# Patient Record
Sex: MALE | Race: WHITE | Employment: OTHER | URBAN - METROPOLITAN AREA
[De-identification: names, ages, dates, MRNs, and addresses within clinical notes are randomized per-mention and may not be internally consistent; named-entity substitution may affect disease eponyms.]

---

## 2024-03-22 ENCOUNTER — LAB REQUISITION (OUTPATIENT)
Dept: LAB | Facility: HOSPITAL | Age: 79
End: 2024-03-22
Payer: COMMERCIAL

## 2024-03-22 PROCEDURE — 88342 IMHCHEM/IMCYTCHM 1ST ANTB: CPT

## 2024-03-22 PROCEDURE — 88312 SPECIAL STAINS GROUP 1: CPT

## 2024-03-22 PROCEDURE — 88365 INSITU HYBRIDIZATION (FISH): CPT

## 2024-03-22 PROCEDURE — 88342 IMHCHEM/IMCYTCHM 1ST ANTB: CPT | Performed by: PATHOLOGY

## 2024-03-22 PROCEDURE — 88323 CONSLTJ&REPRT MATRL PREP SLD: CPT | Performed by: PATHOLOGY

## 2024-03-22 PROCEDURE — 88313 SPECIAL STAINS GROUP 2: CPT | Performed by: PATHOLOGY

## 2024-03-22 PROCEDURE — 88312 SPECIAL STAINS GROUP 1: CPT | Performed by: PATHOLOGY

## 2024-03-22 PROCEDURE — 88313 SPECIAL STAINS GROUP 2: CPT

## 2024-03-22 PROCEDURE — 88341 IMHCHEM/IMCYTCHM EA ADD ANTB: CPT | Performed by: PATHOLOGY

## 2024-03-22 PROCEDURE — 88365 INSITU HYBRIDIZATION (FISH): CPT | Performed by: PATHOLOGY

## 2024-03-22 PROCEDURE — 88341 IMHCHEM/IMCYTCHM EA ADD ANTB: CPT

## 2024-03-28 LAB
LAB AP ASR DISCLAIMER: NORMAL
LABORATORY COMMENT REPORT: NORMAL
PATH REPORT.FINAL DX SPEC: NORMAL
PATH REPORT.GROSS SPEC: NORMAL
PATH REPORT.MICROSCOPIC SPEC OTHER STN: NORMAL
PATH REPORT.RELEVANT HX SPEC: NORMAL
PATH REPORT.TOTAL CANCER: NORMAL

## 2024-04-01 ENCOUNTER — LAB (OUTPATIENT)
Dept: LAB | Facility: HOSPITAL | Age: 79
End: 2024-04-01
Payer: COMMERCIAL

## 2024-04-01 ENCOUNTER — OFFICE VISIT (OUTPATIENT)
Dept: HEMATOLOGY/ONCOLOGY | Facility: HOSPITAL | Age: 79
End: 2024-04-01
Payer: COMMERCIAL

## 2024-04-01 VITALS
TEMPERATURE: 94.6 F | WEIGHT: 163.5 LBS | RESPIRATION RATE: 16 BRPM | DIASTOLIC BLOOD PRESSURE: 70 MMHG | HEART RATE: 82 BPM | SYSTOLIC BLOOD PRESSURE: 138 MMHG | OXYGEN SATURATION: 99 %

## 2024-04-01 DIAGNOSIS — D61.01 PURE RED CELL APLASIA (MULTI): ICD-10-CM

## 2024-04-01 DIAGNOSIS — E72.12 METHYLENETETRAHYDROFOLATE REDUCTASE DEFICIENCY (MULTI): ICD-10-CM

## 2024-04-01 DIAGNOSIS — D61.01 PURE RED CELL APLASIA (MULTI): Primary | ICD-10-CM

## 2024-04-01 LAB
BASOPHILS # BLD AUTO: 0.02 X10*3/UL (ref 0–0.1)
BASOPHILS NFR BLD AUTO: 0.4 %
EOSINOPHIL # BLD AUTO: 0.07 X10*3/UL (ref 0–0.4)
EOSINOPHIL NFR BLD AUTO: 1.3 %
ERYTHROCYTE [DISTWIDTH] IN BLOOD BY AUTOMATED COUNT: 14.4 % (ref 11.5–14.5)
HCT VFR BLD AUTO: 27.6 % (ref 41–52)
HGB BLD-MCNC: 9.3 G/DL (ref 13.5–17.5)
HGB RETIC QN: 32 PG (ref 28–38)
HOLD SPECIMEN: NORMAL
IMM GRANULOCYTES # BLD AUTO: 0 X10*3/UL (ref 0–0.5)
IMM GRANULOCYTES NFR BLD AUTO: 0 % (ref 0–0.9)
IMMATURE RETIC FRACTION: 1.4 %
LYMPHOCYTES # BLD AUTO: 1.79 X10*3/UL (ref 0.8–3)
LYMPHOCYTES NFR BLD AUTO: 33.5 %
MCH RBC QN AUTO: 29.2 PG (ref 26–34)
MCHC RBC AUTO-ENTMCNC: 33.7 G/DL (ref 32–36)
MCV RBC AUTO: 87 FL (ref 80–100)
MONOCYTES # BLD AUTO: 0.84 X10*3/UL (ref 0.05–0.8)
MONOCYTES NFR BLD AUTO: 15.7 %
NEUTROPHILS # BLD AUTO: 2.62 X10*3/UL (ref 1.6–5.5)
NEUTROPHILS NFR BLD AUTO: 49.1 %
NRBC BLD-RTO: 0 /100 WBCS (ref 0–0)
PLATELET # BLD AUTO: 159 X10*3/UL (ref 150–450)
RBC # BLD AUTO: 3.18 X10*6/UL (ref 4.5–5.9)
RETICS #: 0.01 X10*6/UL (ref 0.02–0.11)
RETICS/RBC NFR AUTO: 0.2 % (ref 0.5–2)
WBC # BLD AUTO: 5.3 X10*3/UL (ref 4.4–11.3)

## 2024-04-01 PROCEDURE — 88185 FLOWCYTOMETRY/TC ADD-ON: CPT | Mod: TC

## 2024-04-01 PROCEDURE — 85025 COMPLETE CBC W/AUTO DIFF WBC: CPT

## 2024-04-01 PROCEDURE — 88189 FLOWCYTOMETRY/READ 16 & >: CPT | Performed by: INTERNAL MEDICINE

## 2024-04-01 PROCEDURE — 1159F MED LIST DOCD IN RCRD: CPT | Performed by: INTERNAL MEDICINE

## 2024-04-01 PROCEDURE — 82668 ASSAY OF ERYTHROPOIETIN: CPT

## 2024-04-01 PROCEDURE — 99417 PROLNG OP E/M EACH 15 MIN: CPT | Performed by: INTERNAL MEDICINE

## 2024-04-01 PROCEDURE — 99215 OFFICE O/P EST HI 40 MIN: CPT | Performed by: INTERNAL MEDICINE

## 2024-04-01 PROCEDURE — 1126F AMNT PAIN NOTED NONE PRSNT: CPT | Performed by: INTERNAL MEDICINE

## 2024-04-01 PROCEDURE — 85045 AUTOMATED RETICULOCYTE COUNT: CPT

## 2024-04-01 PROCEDURE — 36415 COLL VENOUS BLD VENIPUNCTURE: CPT

## 2024-04-01 PROCEDURE — 99205 OFFICE O/P NEW HI 60 MIN: CPT | Performed by: INTERNAL MEDICINE

## 2024-04-01 RX ORDER — LORATADINE 10 MG/1
10 TABLET ORAL DAILY
COMMUNITY

## 2024-04-01 RX ORDER — LOSARTAN POTASSIUM 50 MG/1
50 TABLET ORAL DAILY
COMMUNITY

## 2024-04-01 RX ORDER — HYDROGEN PEROXIDE 3 %
20 SOLUTION, NON-ORAL MISCELLANEOUS
COMMUNITY

## 2024-04-01 RX ORDER — PREGABALIN 25 MG/1
25 CAPSULE ORAL 2 TIMES DAILY
COMMUNITY

## 2024-04-01 RX ORDER — EVOLOCUMAB 140 MG/ML
140 INJECTION, SOLUTION SUBCUTANEOUS
COMMUNITY

## 2024-04-01 RX ORDER — ASPIRIN 81 MG/1
81 TABLET ORAL DAILY
COMMUNITY

## 2024-04-01 ASSESSMENT — ENCOUNTER SYMPTOMS
LOSS OF SENSATION IN FEET: 0
DEPRESSION: 1
OCCASIONAL FEELINGS OF UNSTEADINESS: 0

## 2024-04-01 ASSESSMENT — PAIN SCALES - GENERAL: PAINLEVEL_OUTOF10: 0-NO PAIN

## 2024-04-01 NOTE — LETTER
April 4, 2024     Armand Oconnell MD  [address] [place] [address] Hematology And Oncology  [place] [id number]    Patient: Leopoldo Angel   YOB: 1945   Date of Visit: 4/1/2024       Dear Dr. Anish MD and Dr. Alcaraz:     Thank you for referring Leopoldo Angel to me for evaluation. Below are my notes for this consultation.  I agree with the suspected diagnosis of red cell aplasia and I think the differential diagnosis favors a virally mediated process, but I am obtaining peripheral blood flow cytometry and will review his case at tumor board for other potential causes.  This should occur on Thursday 4/4.     If you have questions, please do not hesitate to call me. I look forward to following your patient along with you.       Sincerely,     Philip Tovar MD      CC: Elias Phipps MD  ______________________________________________________________________________________    Patient ID: Leopoldo Angel is a 79 y.o. male.  Referring Physician: Armand Oconnell MD  [address] [place] [address] [place] [address]  Primary Care Provider: No primary care provider on file.    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  Retic count was profoundly low. Initial evaluation with normal folate, normal to high B12, high ferritin, high to normal iron,  parvovirus B19 serology positive for IgG but not IgM    Notably, haptoglobin low at < 10, LDH slightly high, but negative DAPHNE and bilirubin normal.     Had a bone marrow biopsy completed 3/18 demonstrating:   BONE MARROW CLOT WITH ASPIRATE AND CORE WITH TOUCH PREP, SITE UNSPECIFIED ([facility], [id number], 3/18/2024):     -- HYPERCELLULAR BONE MARROW (50-60%) WITH MARKED  ERYTHROID HYPOPLASIA AND SCATTERED INTERSTITIAL LYMPHOID AGGREGATES, SOME WITH MINUTE GRANULOMAS, SEE NOTE.   NOTE: Per report, flow cytometry detected a small monoclonal lambda-restricted, CD5-negative, [id number] B-cell population (6% of total events. The aggregates seen are predominantly composed of small T lymphocytes. 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 The marked erythroid hypoplasia with left shifted erythropoiesis are suggestive of red cell aplasia. Clinical and radiological correlation is recommended.                 Current Assessment & Plan     4/1/2024: long discussoin with patient and spouse today.  His medications are.  Despite the low haptoglobin, the length of time that red cell transfusion are lasting make hemolysis seem unlikely, and a red cell aplasia is favored.  The presence of a B-cell clonal population suggests this may be related - I recommended we complete peripheral flow to define this and see if it could be CLL related.  However, imaging and exam are not suggestive of lymphadenopathy, and patient reports this has been present since the 2010s with no previous sequelae.  Reportedly, parvirus is pending, and I will try to obtain additional results from [facility]. Finally, I recommended we review his pathology at tumor board.  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  Suspected to be secondary to this condition                      Oncology History    No history exists.            Subjective      History of Present Illness:  First hematologist.     Mr. Angel recounted most of his history.  Reported that in April 2021, he had an unprovoked DVT with massive PE, included with right heart strain.  Because he was short of breat at the ED.  When they concepcion his blood.  D-dimer was 1500.  Recalled a CT/PE study and could see see his own emboli from the control panel.  Now on lifelong apixiban.     In Oct in 2023, he had abdl pain, and then a CT scan there was consuelo with normal labs at the time.  Had some pain.  After returning radha, four days later, he developed diffuse limb shaking - rigor every where.  They called 911 - a repeat CT scan shows a perforated gall bladder.  He had an operation in the hospital, and he had 3 gram negative organisms.  He remembers vividly having some logical parts intact and then having \"another part\" being loopy.  He didn't remember what he was saying - apparently attempted a lap-choley, but had to convert to open, and had to have a drain in for a little while.  The drain worked well - had a midline going home with piptazo for 1.5 weeks of IV abx with pip-tazo.  Never had a repeat blood cultures.    Then, after getting over that, on 12/19/2024, he came down with an upper respiratory infections - was very wiped out overall. Called his doctor, OK, getting lab and that day and was at dinner, and wasn't feeling, and was again getting a little delirous again, and had another 911 call.  Then he was looking at his results, and found he had Covid.  Wasn't admitted, and responded well to molnuparavir.    Then again started getting fatigue, weakness, and didn't lose his sense of smell " or taste and was just really wiped out at the time and was worried about long covid - and that's when a CBC showed his hematocrit had dropped.  His CRP was very elevated at the time.  Continued to get wiped out over and over, and was referred back to hematoilogy (who was travelling) and ended up with Dr. Miranda.  The hematocrit has been dropping since that time, and getting a unit of blood a week.  This most recent, they wondered about getiting two units - seems like he's still going since a transfusion on Friday - without any other symptoms.  No leg     5-6 years ago, he had a viral infection and had high LFTS, and that's when he discovered he had gall stones (now normal).  He was worried about disease, and had a CT scan, on a caudal scan with a pubic symphisys - 3cm, lytic tumor.  They had a biopsy back then (CT guided) and then an orthopedic surgeon - and at that the time, they didn't know what it was, and it was a grand rounds presentations. PET/CT s have been tracking it, and it's remained mildly active, and has just been sitting there.         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       Objective     VS:  /70 (BP Location: Left arm, Patient Position: Sitting, BP Cuff Size: Adult)   Pulse 82   Temp 34.8 °C (94.6 °F) (Skin)   Resp 16   Wt 74.2 kg (163 lb 8 oz)   SpO2 99%   BSA: There is no height or weight on file to  calculate BSA.    Physical Exam    Laboratory:  Lab on 04/01/2024   Component Date Value Ref Range Status   • WBC 04/01/2024 5.3  4.4 - 11.3 x10*3/uL Final   • nRBC 04/01/2024 0.0  0.0 - 0.0 /100 WBCs Final   • RBC 04/01/2024 3.18 (L)  4.50 - 5.90 x10*6/uL Final   • Hemoglobin 04/01/2024 9.3 (L)  13.5 - 17.5 g/dL Final   • Hematocrit 04/01/2024 27.6 (L)  41.0 - 52.0 % Final   • MCV 04/01/2024 87  80 - 100 fL Final   • MCH 04/01/2024 29.2  26.0 - 34.0 pg Final   • MCHC 04/01/2024 33.7  32.0 - 36.0 g/dL Final   • RDW 04/01/2024 14.4  11.5 - 14.5 % Final   • Platelets 04/01/2024 159  150 - 450 x10*3/uL Final   • Neutrophils % 04/01/2024 49.1  40.0 - 80.0 % Final   • Immature Granulocytes %, Automated 04/01/2024 0.0  0.0 - 0.9 % Final   • Lymphocytes % 04/01/2024 33.5  13.0 - 44.0 % Final   • Monocytes % 04/01/2024 15.7  2.0 - 10.0 % Final   • Eosinophils % 04/01/2024 1.3  0.0 - 6.0 % Final   • Basophils % 04/01/2024 0.4  0.0 - 2.0 % Final   • Neutrophils Absolute 04/01/2024 2.62  1.60 - 5.50 x10*3/uL Final   • Immature Granulocytes Absolute, Au* 04/01/2024 0.00  0.00 - 0.50 x10*3/uL Final   • Lymphocytes Absolute 04/01/2024 1.79  0.80 - 3.00 x10*3/uL Final   • Monocytes Absolute 04/01/2024 0.84 (H)  0.05 - 0.80 x10*3/uL Final   • Eosinophils Absolute 04/01/2024 0.07  0.00 - 0.40 x10*3/uL Final   • Basophils Absolute 04/01/2024 0.02  0.00 - 0.10 x10*3/uL Final   • Retic % 04/01/2024 0.2 (L)  0.5 - 2.0 % Final   • Retic Absolute 04/01/2024 0.006 (L)  0.017 - 0.110 x10*6/uL Final   • Reticulocyte Hemoglobin 04/01/2024 32  28 - 38 pg Final   • Immature Retic fraction 04/01/2024 1.4  <=16.0 % Final   • Extra Tube 04/01/2024 Hold for add-ons.   Final         Pathology:  Bone Marrow Biopsy:   FINAL DIAGNOSIS   Date Value Ref Range Status   03/22/2024   Final    A-B. BONE MARROW CLOT WITH ASPIRATE AND CORE WITH TOUCH PREP, SITE UNSPECIFIED ([facility], [id number], 3/18/2024):     -- HYPERCELLULAR BONE MARROW (50-60%)  WITH MARKED ERYTHROID HYPOPLASIA AND SCATTERED INTERSTITIAL LYMPHOID AGGREGATES, SOME WITH MINUTE GRANULOMAS, SEE NOTE.    NOTE: Per report, flow cytometry detected a small monoclonal lambda-restricted, CD5-negative, [id number] B-cell population (6% of total events. The aggregates seen are predominantly composed of small T lymphocytes. 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 The marked erythroid hypoplasia with left shifted erythropoiesis are suggestive of red cell aplasia. Clinical and radiological correlation is recommended.       Microscopic Description   Date Value Ref Range Status   03/22/2024   Final    Cell Type Value Reference Range   Promyelocytes 4 1-5 %   Myelocytes 10 5-10 %   Metamyelocytes 27 10-25 %   Bands 5.5 10-20 %   Segmented Neutrophils 22 5-30 %   Eosinophils 2.5 2-4 %   Basophils 0 0-1 %        Lymphocytes 22 5-25 %   Monocytes 2 0-2 %   Plasma Cells 0 0-2 %        Blasts 0 0-1 %        Total Erythroid 5 17-35 %        Total Cells Counted 200    Myeloid/Erythroid Ratio 14.2 1.5-4.1     CBC (Per Report): WBC 5.9, RBC 2.32, Hb 7.1, HCT 20.7, MCV 89.1, RDW 14.3, , Neutrophils 47%, Lymphocytes 31%, Monocytes 19%, Eosinophils 3%,     PERIPHERAL SMEAR: Submitted              Red cells: Normocytic anemia with no specific morphology.       White cells: Normal.        Platelets: Normal morphology, slightly decreased.    ASPIRATE SMEAR: Submitted                      Specimen: Aspicular, hemodiluted.       Erythropoiesis: Markedly decreased and left shifted.       Granulopoiesis: Normal.       Megakaryocytes: Present. Morphology: Normal.    TOUCH PREP: Submitted       Specimen: Paucicellular.       Comments: Similar to aspirate smear.    ASPIRATE CLOT: Submitted                           Specimen: Pacuspicular.       Cellularity: 60%          Estimated M:E ratio: Consistent with aspirate smear.       Megakaryocytes: Adequate. Morphology: Normal.       Comments: Presence of few interstitial lymphoid aggregates, some admixed with minute granulomas.    CORE BIOPSY: Submitted                            Specimen: Limited due to marked hemorrhagic disruption artifact.       Cellularity: 50%       Estimated M:E ratio: Consistent with aspirate smear.       Bony trabeculae: Normal.       Megakaryocytes: Adequate. Morphology: Normal.         Comments: Presence of few interstitial lymphoid aggregates, some admixed with minute granulomas.    SPECIAL STAINS performed at [facility]:       Iron: Storage iron decreased but present; no ring sideroblasts identified.       GMS: Negative.       AFB: Negative.    IMMUNOHISTOCHEMISTRY performed at [facility] on block B1:   MPO: Highlights myeloid cells.    E-cadherin: Highlights scattered early erythroid precursors.   CD3: Highlights scattered and majority of lymphocytes in aggregates.   CD20: Highlights scattered and very few cells in the aggregates.    CD5: Similar to CD3.    CD10: Predominantly negative in lymphocytes.   BCL6: Predominantly negative in lymphocytes.   BCL2: Highlights lymphocytes.   Cyclin D1: Highlights scattered histiocytes.   SOX11: Negative.   : Highlights plasma cells (<1%)   Kappa and lambda (IHC): No light chain restriction (Kappa:Lambda=1:1)   Kappa and lambda (ARACELI): No light chain restriction (Kappa:Lambda=1:1)   CD30: Highlights few scattered immunoblasts.    CD15: Highlights many granulocytes.   EDWIN: Negative.    ALK: Negative.    FLOW CYTOMETRY: Performed at [facility].  Per report, a monoclonal lambda moderate, CD19 positive B-cell population (6% of total) was detected expressing CD11c, CD20, CD44, CD52, and FMC7.  They do not express CD5 or CD10.  There was a mixed population of myeloid cells (72% of total) and T cells account for 22%.  There was no increase in blasts.    CYTOGENETICS/  MOLECULAR: Per note, cytogenetics, FISH for AML, and next generation sequencing testing has been sent and results pending at the time of this review.    Immunostains were performed in addition to flow cytometry to fully characterize the phenotype, architecture, and extent of the marrow population(s).  This was medically necessary for the best possible diagnosis.         Gross Description   Date Value Ref Range Status   03/22/2024   Final    A. OUTSIDE BLOCK(S)/SLIDE(S).  Received from [place] [id number] are Four (4) Microscopic slides labeled [id number] & Block A1  B. OUTSIDE SLIDES , PARTS AFTER A.  Received from [place] [id number] are Eleven (11) Microscopic slides labeled [id number] & Block B1                    Philip Tovar MD

## 2024-04-01 NOTE — PROGRESS NOTES
Patient ID: Leopoldo Angel is a 79 y.o. male.  Referring Physician: Armand Oconnell MD  [address] [place] [address] [place] [address]  Primary Care Provider: No primary care provider on file.    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  Retic count was profoundly low. Initial evaluation with normal folate, normal to high B12, high ferritin, high to normal iron,  parvovirus B19 serology positive for IgG but not IgM    Notably, haptoglobin low at < 10, LDH slightly high, but negative DAPHNE and bilirubin normal.     Had a bone marrow biopsy completed 3/18 demonstrating:   BONE MARROW CLOT WITH ASPIRATE AND CORE WITH TOUCH PREP, SITE UNSPECIFIED ([facility], [id number], 3/18/2024):     -- HYPERCELLULAR BONE MARROW (50-60%) WITH MARKED ERYTHROID HYPOPLASIA AND SCATTERED INTERSTITIAL LYMPHOID AGGREGATES, SOME WITH MINUTE GRANULOMAS, SEE NOTE.   NOTE: Per report, flow cytometry detected a small monoclonal lambda-restricted, CD5-negative, RR75-sxddjyoy B-cell population (6% of total events. The aggregates seen are predominantly composed of small T lymphocytes. 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 The marked erythroid hypoplasia with left shifted erythropoiesis are suggestive of red cell aplasia. Clinical and radiological correlation is recommended.                 Current Assessment & Plan     4/1/2024: long discussoin with patient and spouse today.  His medications are.  Despite the low haptoglobin, the length of time that red cell transfusion are lasting make hemolysis seem unlikely, and  a red cell aplasia is favored.  The presence of a B-cell clonal population suggests this may be related - I recommended we complete peripheral flow to define this and see if it could be CLL related.  However, imaging and exam are not suggestive of lymphadenopathy, and patient reports this has been present since the 2010s with no previous sequelae.  Reportedly, parvirus is pending, and I will try to obtain additional results from [place]. Finally, I recommended we review his pathology at tumor board.  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  Suspected to be secondary to this condition                      Oncology History    No history exists.            Subjective       History of Present Illness:  First hematologist.     Mr. Angel recounted most of his history.  Reported that in April 2021, he had an unprovoked DVT with massive PE, included with right heart strain.  Because he was short of breat at the ED.  When they concepcion his blood.  D-dimer was 1500.  Recalled a CT/PE study and could see see his own emboli from the control panel.  Now on lifelong apixiban.     In Oct in 2023, he had abdl pain, and then a CT scan there was consuelo with normal labs at the time.  Had some pain.  After returning [facility], four days later, he developed diffuse limb shaking - rigor every where.  " They called 911 - a repeat CT scan shows a perforated gall bladder.  He had an operation in the hospital, and he had 3 gram negative organisms.  He remembers vividly having some logical parts intact and then having \"another part\" being loopy.  He didn't remember what he was saying - apparently attempted a lap-choley, but had to convert to open, and had to have a drain in for a little while.  The drain worked well - had a midline going home with piptazo for 1.5 weeks of IV abx with pip-tazo.  Never had a repeat blood cultures.    Then, after getting over that, on 12/19/2024, he came down with an upper respiratory infections - was very wiped out overall. Called his doctor, OK, getting lab and that day and was at dinner, and wasn't feeling, and was again getting a little delirous again, and had another 911 call.  Then he was looking at his results, and found he had Covid.  Wasn't admitted, and responded well to molnuparavir.    Then again started getting fatigue, weakness, and didn't lose his sense of smell or taste and was just really wiped out at the time and was worried about long covid - and that's when a CBC showed his hematocrit had dropped.  His CRP was very elevated at the time.  Continued to get wiped out over and over, and was referred back to hematoilogy (who was travelling) and ended up with Dr. Miranda.  The hematocrit has been dropping since that time, and getting a unit of blood a week.  This most recent, they wondered about getiting two units - seems like he's still going since a transfusion on Friday - without any other symptoms.  No leg     5-6 years ago, he had a viral infection and had high LFTS, and that's when he discovered he had gall stones (now normal).  He was worried about disease, and had a CT scan, on a caudal scan with a pubic symphisys - 3cm, lytic tumor.  They had a biopsy back then (CT guided) and then an orthopedic surgeon - and at that the time, they didn't know what it was, and it was " a grand rounds presentations. PET/CT s have been tracking it, and it's remained mildly active, and has just been sitting there.         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       Objective      VS:  /70 (BP Location: Left arm, Patient Position: Sitting, BP Cuff Size: Adult)   Pulse 82   Temp 34.8 °C (94.6 °F) (Skin)   Resp 16   Wt 74.2 kg (163 lb 8 oz)   SpO2 99%   BSA: There is no height or weight on file to calculate BSA.    Physical Exam    Laboratory:  Lab on 04/01/2024   Component Date Value Ref Range Status    WBC 04/01/2024 5.3  4.4 - 11.3 x10*3/uL Final    nRBC 04/01/2024 0.0  0.0 - 0.0 /100 WBCs Final    RBC 04/01/2024 3.18 (L)  4.50 - 5.90 x10*6/uL Final    Hemoglobin 04/01/2024 9.3 (L)  13.5 - 17.5 g/dL Final    Hematocrit 04/01/2024 27.6 (L)  41.0 - 52.0 % Final    MCV 04/01/2024 87  80 - 100 fL Final    MCH 04/01/2024 29.2  26.0 - 34.0 pg Final    MCHC 04/01/2024 33.7  32.0 - 36.0 g/dL Final    RDW 04/01/2024 14.4  11.5 - 14.5 % Final    Platelets 04/01/2024 159  150 - 450 x10*3/uL Final    Neutrophils % 04/01/2024 49.1  40.0 - 80.0 % Final    Immature Granulocytes %, Automated 04/01/2024 0.0  0.0 - 0.9 % Final    Lymphocytes % 04/01/2024 33.5  13.0 - 44.0 % Final    Monocytes % 04/01/2024 15.7  2.0 - 10.0 % Final    Eosinophils % 04/01/2024 1.3  0.0 - 6.0 % Final    Basophils % 04/01/2024 0.4  0.0 - 2.0 %  Final    Neutrophils Absolute 04/01/2024 2.62  1.60 - 5.50 x10*3/uL Final    Immature Granulocytes Absolute, Au* 04/01/2024 0.00  0.00 - 0.50 x10*3/uL Final    Lymphocytes Absolute 04/01/2024 1.79  0.80 - 3.00 x10*3/uL Final    Monocytes Absolute 04/01/2024 0.84 (H)  0.05 - 0.80 x10*3/uL Final    Eosinophils Absolute 04/01/2024 0.07  0.00 - 0.40 x10*3/uL Final    Basophils Absolute 04/01/2024 0.02  0.00 - 0.10 x10*3/uL Final    Retic % 04/01/2024 0.2 (L)  0.5 - 2.0 % Final    Retic Absolute 04/01/2024 0.006 (L)  0.017 - 0.110 x10*6/uL Final    Reticulocyte Hemoglobin 04/01/2024 32  28 - 38 pg Final    Immature Retic fraction 04/01/2024 1.4  <=16.0 % Final    Extra Tube 04/01/2024 Hold for add-ons.   Final         Pathology:  Bone Marrow Biopsy:   FINAL DIAGNOSIS   Date Value Ref Range Status   03/22/2024   Final    A-B. BONE MARROW CLOT WITH ASPIRATE AND CORE WITH TOUCH PREP, SITE UNSPECIFIED ([facility], [id number], 3/18/2024):     -- HYPERCELLULAR BONE MARROW (50-60%) WITH MARKED ERYTHROID HYPOPLASIA AND SCATTERED INTERSTITIAL LYMPHOID AGGREGATES, SOME WITH MINUTE GRANULOMAS, SEE NOTE.    NOTE: Per report, flow cytometry detected a small monoclonal lambda-restricted, CD5-negative, HM36-getupbar B-cell population (6% of total events. The aggregates seen are predominantly composed of small T lymphocytes. 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 The marked erythroid hypoplasia with left shifted erythropoiesis are suggestive of red cell aplasia. Clinical and radiological correlation is recommended.       Microscopic Description   Date Value Ref Range Status   03/22/2024   Final    Cell Type Value Reference Range   Promyelocytes 4 1-5 %   Myelocytes 10 5-10 %   Metamyelocytes 27 10-25 %   Bands 5.5 10-20 %   Segmented Neutrophils 22 5-30 %   Eosinophils 2.5 2-4 %    Basophils 0 0-1 %        Lymphocytes 22 5-25 %   Monocytes 2 0-2 %   Plasma Cells 0 0-2 %        Blasts 0 0-1 %        Total Erythroid 5 17-35 %        Total Cells Counted 200    Myeloid/Erythroid Ratio 14.2 1.5-4.1     CBC (Per Report): WBC 5.9, RBC 2.32, Hb 7.1, HCT 20.7, MCV 89.1, RDW 14.3, , Neutrophils 47%, Lymphocytes 31%, Monocytes 19%, Eosinophils 3%,     PERIPHERAL SMEAR: Submitted              Red cells: Normocytic anemia with no specific morphology.       White cells: Normal.        Platelets: Normal morphology, slightly decreased.    ASPIRATE SMEAR: Submitted                      Specimen: Aspicular, hemodiluted.       Erythropoiesis: Markedly decreased and left shifted.       Granulopoiesis: Normal.       Megakaryocytes: Present. Morphology: Normal.    TOUCH PREP: Submitted       Specimen: Paucicellular.       Comments: Similar to aspirate smear.    ASPIRATE CLOT: Submitted                           Specimen: Pacuspicular.       Cellularity: 60%         Estimated M:E ratio: Consistent with aspirate smear.       Megakaryocytes: Adequate. Morphology: Normal.       Comments: Presence of few interstitial lymphoid aggregates, some admixed with minute granulomas.    CORE BIOPSY: Submitted                            Specimen: Limited due to marked hemorrhagic disruption artifact.       Cellularity: 50%       Estimated M:E ratio: Consistent with aspirate smear.       Bony trabeculae: Normal.       Megakaryocytes: Adequate. Morphology: Normal.         Comments: Presence of few interstitial lymphoid aggregates, some admixed with minute granulomas.    SPECIAL STAINS performed at [facility]:       Iron: Storage iron decreased but present; no ring sideroblasts identified.       GMS: Negative.       AFB: Negative.    IMMUNOHISTOCHEMISTRY performed at [facility] on block B1:   MPO: Highlights myeloid cells.    E-cadherin: Highlights scattered early erythroid precursors.   CD3: Highlights scattered and majority of  lymphocytes in aggregates.   CD20: Highlights scattered and very few cells in the aggregates.    CD5: Similar to CD3.    CD10: Predominantly negative in lymphocytes.   BCL6: Predominantly negative in lymphocytes.   BCL2: Highlights lymphocytes.   Cyclin D1: Highlights scattered histiocytes.   SOX11: Negative.   : Highlights plasma cells (<1%)   Kappa and lambda (IHC): No light chain restriction (Kappa:Lambda=1:1)   Kappa and lambda (ARACELI): No light chain restriction (Kappa:Lambda=1:1)   CD30: Highlights few scattered immunoblasts.    CD15: Highlights many granulocytes.   EDWIN: Negative.    ALK: Negative.    FLOW CYTOMETRY: Performed at [facility].  Per report, a monoclonal lambda moderate, CD19 positive B-cell population (6% of total) was detected expressing CD11c, CD20, CD44, CD52, and FMC7.  They do not express CD5 or CD10.  There was a mixed population of myeloid cells (72% of total) and T cells account for 22%.  There was no increase in blasts.    CYTOGENETICS/ MOLECULAR: Per note, cytogenetics, FISH for AML, and next generation sequencing testing has been sent and results pending at the time of this review.    Immunostains were performed in addition to flow cytometry to fully characterize the phenotype, architecture, and extent of the marrow population(s).  This was medically necessary for the best possible diagnosis.         Gross Description   Date Value Ref Range Status   03/22/2024   Final    A. OUTSIDE BLOCK(S)/SLIDE(S).  Received from [place] [id number] are Four (4) Microscopic slides labeled [id number] & Block A1  B. OUTSIDE SLIDES , PARTS AFTER A.  Received from [place] [id number] are Eleven (11) Microscopic slides labeled [id number] & Block B1                    Philip Tovar MD

## 2024-04-02 PROBLEM — E72.12 METHYLENETETRAHYDROFOLATE REDUCTASE DEFICIENCY (MULTI): Status: ACTIVE | Noted: 2024-04-02

## 2024-04-02 PROBLEM — D61.01: Status: ACTIVE | Noted: 2024-04-02

## 2024-04-02 NOTE — ASSESSMENT & PLAN NOTE
4/1/2024: long discussoin with patient and spouse today.  His medications are.  Despite the low haptoglobin, the length of time that red cell transfusion are lasting make hemolysis seem unlikely, and a red cell aplasia is favored.  The presence of a B-cell clonal population suggests this may be related - I recommended we complete peripheral flow to define this and see if it could be CLL related.  However, imaging and exam are not suggestive of lymphadenopathy, and patient reports this has been present since the 2010s with no previous sequelae.  Reportedly, parvirus is pending, and I will try to obtain additional results from [place]. Finally, I recommended we review his pathology at tumor board.  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     Diagnostics:  - tumor board  - flow cytometry  - will collect remaining/pending results from [place]  Treatment:  - TBD

## 2024-04-03 LAB
CELL COUNT (BLOOD): 5.3 X10*3/UL
CELL POPULATIONS: NORMAL
DIAGNOSIS: NORMAL
EPO SERPL-ACNC: 835 MU/ML (ref 4–27)
FLOW DIFFERENTIAL: NORMAL
FLOW TEST ORDERED: NORMAL
LAB TEST METHOD: NORMAL
NUMBER OF CELLS COLLECTED: NORMAL PER TUBE
PATH REPORT.TOTAL CANCER: NORMAL
SIGNATURE COMMENT: NORMAL
SPECIMEN VIABILITY: NORMAL

## 2024-04-04 ENCOUNTER — TELEMEDICINE (OUTPATIENT)
Dept: HEMATOLOGY/ONCOLOGY | Facility: CLINIC | Age: 79
End: 2024-04-04
Payer: COMMERCIAL

## 2024-04-04 ENCOUNTER — TUMOR BOARD CONFERENCE (OUTPATIENT)
Dept: HEMATOLOGY/ONCOLOGY | Facility: HOSPITAL | Age: 79
End: 2024-04-04
Payer: COMMERCIAL

## 2024-04-04 DIAGNOSIS — D61.01 PURE RED CELL APLASIA (MULTI): Primary | ICD-10-CM

## 2024-04-04 PROCEDURE — 99213 OFFICE O/P EST LOW 20 MIN: CPT | Performed by: INTERNAL MEDICINE

## 2024-04-04 PROCEDURE — 1159F MED LIST DOCD IN RCRD: CPT | Performed by: INTERNAL MEDICINE

## 2024-04-04 ASSESSMENT — ENCOUNTER SYMPTOMS
SORE THROAT: 0
CHILLS: 0
DIFFICULTY URINATING: 0
LIGHT-HEADEDNESS: 0
HEADACHES: 0
DIARRHEA: 0
ABDOMINAL PAIN: 0
FLANK PAIN: 0
NAUSEA: 0
CONFUSION: 0
DYSPHORIC MOOD: 0
BACK PAIN: 0
SHORTNESS OF BREATH: 0
SINUS PRESSURE: 0
UNEXPECTED WEIGHT CHANGE: 0
RHINORRHEA: 0
FATIGUE: 1
BRUISES/BLEEDS EASILY: 0
APPETITE CHANGE: 0
COUGH: 0
ADENOPATHY: 0
SINUS PAIN: 0
JOINT SWELLING: 0
FEVER: 0
ACTIVITY CHANGE: 0
VOMITING: 0
NUMBNESS: 0
NERVOUS/ANXIOUS: 0
CONSTIPATION: 0
DIAPHORESIS: 0
WEAKNESS: 0

## 2024-04-04 NOTE — LETTER
April 4, 2024     Armand Oconnell MD  [address] [facility]  [facility] [address]    Patient: Leopoldo Angel   YOB: 1945   Date of Visit: 4/4/2024       Dear Dr. Armand Oconnell MD:    Thank you for referring Leopoldo Angel to me for evaluation. Below are my notes for his follow up visit today, along with the summary from our discussion in tumor board.  We recommended checking parvovirus DNA as well as a CT chest to r/o thymoma.  After that, recommended glucocorticord therapy - this is similar to treatment for immune thrombocytopenia, with 1mg/kg for at least two weeks to maximal response and then slow taper over 6-8 weeks.      If you have questions, please do not hesitate to call me. I look forward to following your patient along with you.       Sincerely,     Philip Tovar MD      CC: No Recipients  ______________________________________________________________________________________    Patient ID: Leopoldo Angel is a 79 y.o. male.  Referring Physician: [place]  [address] [place] [address]  Primary Care Provider: No primary care provider on file.    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  Retic count was profoundly low. Initial evaluation with normal folate, normal to high B12, high ferritin, high to normal iron,  parvovirus B19 serology positive for IgG but not IgM    Notably, haptoglobin low at < 10, LDH slightly high, but negative DAPHNE and bilirubin normal.     Had a bone marrow biopsy completed 3/18 demonstrating:   BONE MARROW CLOT WITH ASPIRATE AND CORE WITH TOUCH PREP, SITE UNSPECIFIED ([facility], [id number], 3/18/2024):     -- HYPERCELLULAR BONE MARROW (50-60%) WITH  MARKED ERYTHROID HYPOPLASIA AND SCATTERED INTERSTITIAL LYMPHOID AGGREGATES, SOME WITH MINUTE GRANULOMAS, SEE NOTE.   NOTE: Per report, flow cytometry detected a small monoclonal lambda-restricted, CD5-negative, [id number] B-cell population (6% of total events. The aggregates seen are predominantly composed of small T lymphocytes. 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 The marked erythroid hypoplasia with left shifted erythropoiesis are suggestive of red cell aplasia. Clinical and radiological correlation is recommended.                 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  We did not think T-cell clonality testing was needed, but a CT chest to r/o thymoma (does not appear his current imaging includes the upper thoracic area) was recommended, and we would like to get a parvovirus B19 DNA testing (only serology was pending).  Given his transfusion dependence, we also recommended empiric therapy and felt that a course of prednisone may be easier than cyclosporine, as the latter may have increased risks of toxicity.       Diagnostics:  - follow up karyotype  Treatment:  - TBD                          Philip Tovar MD      Verbal consent was requested and obtained from patient on this date for a telehealth visit.    Oncology History    No history exists.        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    Review of Systems   Constitutional:  Positive for fatigue. Negative for activity change, appetite change, chills, diaphoresis, fever and unexpected weight change.   HENT:  Negative  for congestion, mouth sores, nosebleeds, rhinorrhea, sinus pressure, sinus pain and sore throat.    Eyes:  Negative for visual disturbance.   Respiratory:  Negative for cough and shortness of breath.    Cardiovascular:  Negative for chest pain and leg swelling.   Gastrointestinal:  Negative for abdominal pain, constipation, diarrhea, nausea and vomiting.   Genitourinary:  Negative for difficulty urinating and flank pain.   Musculoskeletal:  Negative for back pain and joint swelling.   Skin:  Negative for rash.   Neurological:  Negative for weakness, light-headedness, numbness and headaches.   Hematological:  Negative for adenopathy. Does not bruise/bleed easily.   Psychiatric/Behavioral:  Negative for confusion and dysphoric mood. The patient is not nervous/anxious.        Home Medication Adherence:     OBJECTIVE:    No VS or physical exam - this is a virtual visit.    Laboratory:  Lab on 04/01/2024   Component Date Value Ref Range Status   • WBC 04/01/2024 5.3  4.4 - 11.3 x10*3/uL Final   • nRBC 04/01/2024 0.0  0.0 - 0.0 /100 WBCs Final   • RBC 04/01/2024 3.18 (L)  4.50 - 5.90 x10*6/uL Final   • Hemoglobin 04/01/2024 9.3 (L)  13.5 - 17.5 g/dL Final   • Hematocrit 04/01/2024 27.6 (L)  41.0 - 52.0 % Final   • MCV 04/01/2024 87  80 - 100 fL Final   • MCH 04/01/2024 29.2  26.0 - 34.0 pg Final   • MCHC 04/01/2024 33.7  32.0 - 36.0 g/dL Final   • RDW 04/01/2024 14.4  11.5 - 14.5 % Final   • Platelets 04/01/2024 159  150 - 450 x10*3/uL Final   • Neutrophils % 04/01/2024 49.1  40.0 - 80.0 % Final   • Immature Granulocytes %, Automated 04/01/2024 0.0  0.0 - 0.9 % Final   • Lymphocytes % 04/01/2024 33.5  13.0 - 44.0 % Final   • Monocytes % 04/01/2024 15.7  2.0 - 10.0 % Final   • Eosinophils % 04/01/2024 1.3  0.0 - 6.0 % Final   • Basophils % 04/01/2024 0.4  0.0 - 2.0 % Final   • Neutrophils Absolute 04/01/2024 2.62  1.60 - 5.50 x10*3/uL Final   • Immature Granulocytes Absolute, Au* 04/01/2024 0.00  0.00 - 0.50 x10*3/uL Final    • Lymphocytes Absolute 04/01/2024 1.79  0.80 - 3.00 x10*3/uL Final   • Monocytes Absolute 04/01/2024 0.84 (H)  0.05 - 0.80 x10*3/uL Final   • Eosinophils Absolute 04/01/2024 0.07  0.00 - 0.40 x10*3/uL Final   • Basophils Absolute 04/01/2024 0.02  0.00 - 0.10 x10*3/uL Final   • Retic % 04/01/2024 0.2 (L)  0.5 - 2.0 % Final   • Retic Absolute 04/01/2024 0.006 (L)  0.017 - 0.110 x10*6/uL Final   • Reticulocyte Hemoglobin 04/01/2024 32  28 - 38 pg Final   • Immature Retic fraction 04/01/2024 1.4  <=16.0 % Final   • Erythropoietin 04/01/2024 835 (H)  4 - 27 mU/mL Final   • Case Report 04/01/2024    Final                    Value:Flow Cytometry                                    Case: [id number]                                   Authorizing Provider:  Philip Tovar MD   Collected:           04/01/2024 1503              Ordering Location:     [facility]   Received:            04/01/2024 1504              Pathologist:           Viraj Goodwin MD                                                         Specimen:    Blood, Venous                                                                             • Diagnosis 04/01/2024    Final                    Value:This result contains rich text formatting which cannot be displayed here.   •   04/01/2024    Final                    Value:This result contains rich text formatting which cannot be displayed here.   • Cell Populations 04/01/2024    Final                    Value:This result contains rich text formatting which cannot be displayed here.   • Flow Differential 04/01/2024    Final                    Value:This result contains rich text formatting which cannot be displayed here.   • Flow Test Ordered 04/01/2024 Low Grade Panel  not established Final   • Specimen Viability 04/01/2024 Acceptable  not established Final   • Cell Count 04/01/2024 5.30  not established x10*3/uL Final   • Number of Cells Collected 04/01/2024 100,000.00  not established per  tube Final   • Methodology 04/01/2024    Final                    Value:This result contains rich text formatting which cannot be displayed here.   • Extra Tube 04/01/2024 Hold for add-ons.   Final         Pathology:  Bone Marrow Biopsy:   FINAL DIAGNOSIS   Date Value Ref Range Status   03/22/2024   Final    A-B. BONE MARROW CLOT WITH ASPIRATE AND CORE WITH TOUCH PREP, SITE UNSPECIFIED ([facility], [id number], 3/18/2024):     -- HYPERCELLULAR BONE MARROW (50-60%) WITH MARKED ERYTHROID HYPOPLASIA AND SCATTERED INTERSTITIAL LYMPHOID AGGREGATES, SOME WITH MINUTE GRANULOMAS, SEE NOTE.    NOTE: Per report, flow cytometry detected a small monoclonal lambda-restricted, CD5-negative, [id number] B-cell population (6% of total events. The aggregates seen are predominantly composed of small T lymphocytes. 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 The marked erythroid hypoplasia with left shifted erythropoiesis are suggestive of red cell aplasia. Clinical and radiological correlation is recommended.       Microscopic Description   Date Value Ref Range Status   03/22/2024   Final    Cell Type Value Reference Range   Promyelocytes 4 1-5 %   Myelocytes 10 5-10 %   Metamyelocytes 27 10-25 %   Bands 5.5 10-20 %   Segmented Neutrophils 22 5-30 %   Eosinophils 2.5 2-4 %   Basophils 0 0-1 %        Lymphocytes 22 5-25 %   Monocytes 2 0-2 %   Plasma Cells 0 0-2 %        Blasts 0 0-1 %        Total Erythroid 5 17-35 %        Total Cells Counted 200    Myeloid/Erythroid Ratio 14.2 1.5-4.1     CBC (Per Report): WBC 5.9, RBC 2.32, Hb 7.1, HCT 20.7, MCV 89.1, RDW 14.3, , Neutrophils 47%, Lymphocytes 31%, Monocytes 19%, Eosinophils 3%,     PERIPHERAL SMEAR: Submitted              Red cells: Normocytic anemia with no specific morphology.       White cells: Normal.        Platelets:  Normal morphology, slightly decreased.    ASPIRATE SMEAR: Submitted                      Specimen: Aspicular, hemodiluted.       Erythropoiesis: Markedly decreased and left shifted.       Granulopoiesis: Normal.       Megakaryocytes: Present. Morphology: Normal.    TOUCH PREP: Submitted       Specimen: Paucicellular.       Comments: Similar to aspirate smear.    ASPIRATE CLOT: Submitted                           Specimen: Pacuspicular.       Cellularity: 60%         Estimated M:E ratio: Consistent with aspirate smear.       Megakaryocytes: Adequate. Morphology: Normal.       Comments: Presence of few interstitial lymphoid aggregates, some admixed with minute granulomas.    CORE BIOPSY: Submitted                            Specimen: Limited due to marked hemorrhagic disruption artifact.       Cellularity: 50%       Estimated M:E ratio: Consistent with aspirate smear.       Bony trabeculae: Normal.       Megakaryocytes: Adequate. Morphology: Normal.         Comments: Presence of few interstitial lymphoid aggregates, some admixed with minute granulomas.    SPECIAL STAINS performed at [facility]:       Iron: Storage iron decreased but present; no ring sideroblasts identified.       GMS: Negative.       AFB: Negative.    IMMUNOHISTOCHEMISTRY performed at [facility] on block B1:   MPO: Highlights myeloid cells.    E-cadherin: Highlights scattered early erythroid precursors.   CD3: Highlights scattered and majority of lymphocytes in aggregates.   CD20: Highlights scattered and very few cells in the aggregates.    CD5: Similar to CD3.    CD10: Predominantly negative in lymphocytes.   BCL6: Predominantly negative in lymphocytes.   BCL2: Highlights lymphocytes.   Cyclin D1: Highlights scattered histiocytes.   SOX11: Negative.   : Highlights plasma cells (<1%)   Kappa and lambda (IHC): No light chain restriction (Kappa:Lambda=1:1)   Kappa and lambda (ARACELI): No light chain restriction (Kappa:Lambda=1:1)   CD30: Highlights few  scattered immunoblasts.    CD15: Highlights many granulocytes.   EDWIN: Negative.    ALK: Negative.    FLOW CYTOMETRY: Performed at [facility].  Per report, a monoclonal lambda moderate, CD19 positive B-cell population (6% of total) was detected expressing CD11c, CD20, CD44, CD52, and FMC7.  They do not express CD5 or CD10.  There was a mixed population of myeloid cells (72% of total) and T cells account for 22%.  There was no increase in blasts.    CYTOGENETICS/ MOLECULAR: Per note, cytogenetics, FISH for AML, and next generation sequencing testing has been sent and results pending at the time of this review.    Immunostains were performed in addition to flow cytometry to fully characterize the phenotype, architecture, and extent of the marrow population(s).  This was medically necessary for the best possible diagnosis.         Gross Description   Date Value Ref Range Status   03/22/2024   Final    A. OUTSIDE BLOCK(S)/SLIDE(S).  Received from [place] [id number] are Four (4) Microscopic slides labeled [id number] & Block A1  B. OUTSIDE SLIDES , PARTS AFTER A.  Received from [place] [id number] are Eleven (11) Microscopic slides labeled [id number] & Block B1

## 2024-04-04 NOTE — TUMOR BOARD NOTE
TUMOR BOARD DISCUSSION SUMMARY    PRESENTER: Dr. Philip Tovar     DIAGNOSIS:  Red Cell Aplasia    SUMMARY/PRESENTATION: Leopoldo Angel is a 79 y.o. male patient who presented about 1-2 months after SARS-Cov-2 infection treated with molnuparavir in 12/2023 with severe, newly transfusion dependent anemia.    History: Chronic lymphocytic leukemia    Previous treatment:     Information reviewed: appropriately elevated erythropoietin level  Pathology: (03/2024) Bone Marrow Biopsy - Hypercellular bone marrow (50-60%) with marked erythroid hypoplasia and scattered interstitial lymphoid aggregates, some with minute granulomas small monoclonal B cell population. Suggested of red cell aplasia.     RECOMMENDATIONS: Complete karyotype, start steroids         Disclaimer     [facility] tumor board recommendations represent the consensus opinion of physicians present at a weekly patient care conference. The treating [facility] physician is not always present, and many of the physicians formulating the recommendation have not personally seen or examined the patient under discussion. It is understood that the treating [facility] physician considers the expertise of the Tumor Board Recommendation in formulating his/her plan for the patient. However, in many situations, based on individualized patient considerations, a different plan is determined by the treating physician to be the optimal medical management.     Scribe Attestation  By signing my name below, Mikayla BOURGEOIS Scribe   attest that this documentation has been prepared under the direction and in the presence of MALIGNANT HEME TUMOR BOARD.

## 2024-04-04 NOTE — PROGRESS NOTES
Patient ID: Leopoldo Angel is a 79 y.o. male.  Referring Physician: Philip Tovar MD  [id number] [id number]  Primary Care Provider: No primary care provider on file.    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  Retic count was profoundly low. Initial evaluation with normal folate, normal to high B12, high ferritin, high to normal iron,  parvovirus B19 serology positive for IgG but not IgM    Notably, haptoglobin low at < 10, LDH slightly high, but negative DAPHNE and bilirubin normal.     Had a bone marrow biopsy completed 3/18 demonstrating:   BONE MARROW CLOT WITH ASPIRATE AND CORE WITH TOUCH PREP, SITE UNSPECIFIED ([facility], [id number], 3/18/2024):     -- HYPERCELLULAR BONE MARROW (50-60%) WITH MARKED ERYTHROID HYPOPLASIA AND SCATTERED INTERSTITIAL LYMPHOID AGGREGATES, SOME WITH MINUTE GRANULOMAS, SEE NOTE.   NOTE: Per report, flow cytometry detected a small monoclonal lambda-restricted, CD5-negative, AP95-[id number] B-cell population (6% of total events. The aggregates seen are predominantly composed of small T lymphocytes. 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 The marked erythroid hypoplasia with left shifted erythropoiesis are suggestive of red cell aplasia. Clinical and radiological correlation is recommended.                 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  We did not think T-cell clonality testing was needed, but a CT chest to r/o thymoma (does not  appear his current imaging includes the upper thoracic area) was recommended, and we would like to get a parvovirus B19 DNA testing (only serology was pending).  Given his transfusion dependence, we also recommended empiric therapy and felt that a course of prednisone may be easier than cyclosporine, as the latter may have increased risks of toxicity.       Diagnostics:  - follow up karyotype  Treatment:  - TBD                          Philip Tovar MD      Verbal consent was requested and obtained from patient on this date for a telehealth visit.    Oncology History    No history exists.        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    Review of Systems   Constitutional:  Positive for fatigue. Negative for activity change, appetite change, chills, diaphoresis, fever and unexpected weight change.   HENT:  Negative for congestion, mouth sores, nosebleeds, rhinorrhea, sinus pressure, sinus pain and sore throat.    Eyes:  Negative for visual disturbance.   Respiratory:  Negative for cough and shortness of breath.    Cardiovascular:  Negative for chest pain and leg swelling.   Gastrointestinal:  Negative for abdominal pain, constipation, diarrhea, nausea and vomiting.   Genitourinary:  Negative for difficulty urinating and flank pain.   Musculoskeletal:  Negative for back pain and joint swelling.   Skin:  Negative for rash.   Neurological:  Negative for weakness, light-headedness, numbness and headaches.   Hematological:  Negative for adenopathy. Does not bruise/bleed easily.   Psychiatric/Behavioral:  Negative for confusion and dysphoric mood. The patient is not nervous/anxious.        Home Medication Adherence:     OBJECTIVE:    No VS or physical exam - this is a virtual visit.    Laboratory:  Lab on 04/01/2024   Component Date Value Ref Range  Status    WBC 04/01/2024 5.3  4.4 - 11.3 x10*3/uL Final    nRBC 04/01/2024 0.0  0.0 - 0.0 /100 WBCs Final    RBC 04/01/2024 3.18 (L)  4.50 - 5.90 x10*6/uL Final    Hemoglobin 04/01/2024 9.3 (L)  13.5 - 17.5 g/dL Final    Hematocrit 04/01/2024 27.6 (L)  41.0 - 52.0 % Final    MCV 04/01/2024 87  80 - 100 fL Final    MCH 04/01/2024 29.2  26.0 - 34.0 pg Final    MCHC 04/01/2024 33.7  32.0 - 36.0 g/dL Final    RDW 04/01/2024 14.4  11.5 - 14.5 % Final    Platelets 04/01/2024 159  150 - 450 x10*3/uL Final    Neutrophils % 04/01/2024 49.1  40.0 - 80.0 % Final    Immature Granulocytes %, Automated 04/01/2024 0.0  0.0 - 0.9 % Final    Lymphocytes % 04/01/2024 33.5  13.0 - 44.0 % Final    Monocytes % 04/01/2024 15.7  2.0 - 10.0 % Final    Eosinophils % 04/01/2024 1.3  0.0 - 6.0 % Final    Basophils % 04/01/2024 0.4  0.0 - 2.0 % Final    Neutrophils Absolute 04/01/2024 2.62  1.60 - 5.50 x10*3/uL Final    Immature Granulocytes Absolute, Au* 04/01/2024 0.00  0.00 - 0.50 x10*3/uL Final    Lymphocytes Absolute 04/01/2024 1.79  0.80 - 3.00 x10*3/uL Final    Monocytes Absolute 04/01/2024 0.84 (H)  0.05 - 0.80 x10*3/uL Final    Eosinophils Absolute 04/01/2024 0.07  0.00 - 0.40 x10*3/uL Final    Basophils Absolute 04/01/2024 0.02  0.00 - 0.10 x10*3/uL Final    Retic % 04/01/2024 0.2 (L)  0.5 - 2.0 % Final    Retic Absolute 04/01/2024 0.006 (L)  0.017 - 0.110 x10*6/uL Final    Reticulocyte Hemoglobin 04/01/2024 32  28 - 38 pg Final    Immature Retic fraction 04/01/2024 1.4  <=16.0 % Final    Erythropoietin 04/01/2024 835 (H)  4 - 27 mU/mL Final    Case Report 04/01/2024    Final                    Value:Flow Cytometry                                    Case: [id number]                                   Authorizing Provider:  Philip Tovar MD   Collected:           04/01/2024 1502              Ordering Location:     [facility]   Received:            04/01/2024 1503              Pathologist:           Viraj HOWARD  MD Christa                                                         Specimen:    Blood, Venous                                                                              Diagnosis 04/01/2024    Final                    Value:This result contains rich text formatting which cannot be displayed here.      04/01/2024    Final                    Value:This result contains rich text formatting which cannot be displayed here.    Cell Populations 04/01/2024    Final                    Value:This result contains rich text formatting which cannot be displayed here.    Flow Differential 04/01/2024    Final                    Value:This result contains rich text formatting which cannot be displayed here.    Flow Test Ordered 04/01/2024 Low Grade Panel  not established Final    Specimen Viability 04/01/2024 Acceptable  not established Final    Cell Count 04/01/2024 5.30  not established x10*3/uL Final    Number of Cells Collected 04/01/2024 100,000.00  not established per tube Final    Methodology 04/01/2024    Final                    Value:This result contains rich text formatting which cannot be displayed here.    Extra Tube 04/01/2024 Hold for add-ons.   Final         Pathology:  Bone Marrow Biopsy:   FINAL DIAGNOSIS   Date Value Ref Range Status   03/22/2024   Final    A-B. BONE MARROW CLOT WITH ASPIRATE AND CORE WITH TOUCH PREP, SITE UNSPECIFIED ([facility], [id number], 3/18/2024):     -- HYPERCELLULAR BONE MARROW (50-60%) WITH MARKED ERYTHROID HYPOPLASIA AND SCATTERED INTERSTITIAL LYMPHOID AGGREGATES, SOME WITH MINUTE GRANULOMAS, SEE NOTE.    NOTE: Per report, flow cytometry detected a small monoclonal lambda-restricted, CD5-negative, QP39-[id number] B-cell population (6% of total events. The aggregates seen are predominantly composed of small T lymphocytes. Given the flow cytometry results, the overall findings are consistent with low-level marrow involvement by a B-cell lymphoproliferative disorder below the  morphological level of detection. The marked erythroid hypoplasia with left shifted erythropoiesis are suggestive of red cell aplasia. Clinical and radiological correlation is recommended.       Microscopic Description   Date Value Ref Range Status   03/22/2024   Final    Cell Type Value Reference Range   Promyelocytes 4 1-5 %   Myelocytes 10 5-10 %   Metamyelocytes 27 10-25 %   Bands 5.5 10-20 %   Segmented Neutrophils 22 5-30 %   Eosinophils 2.5 2-4 %   Basophils 0 0-1 %        Lymphocytes 22 5-25 %   Monocytes 2 0-2 %   Plasma Cells 0 0-2 %        Blasts 0 0-1 %        Total Erythroid 5 17-35 %        Total Cells Counted 200    Myeloid/Erythroid Ratio 14.2 1.5-4.1     CBC (Per Report): WBC 5.9, RBC 2.32, Hb 7.1, HCT 20.7, MCV 89.1, RDW 14.3, , Neutrophils 47%, Lymphocytes 31%, Monocytes 19%, Eosinophils 3%,     PERIPHERAL SMEAR: Submitted              Red cells: Normocytic anemia with no specific morphology.       White cells: Normal.        Platelets: Normal morphology, slightly decreased.    ASPIRATE SMEAR: Submitted                      Specimen: Aspicular, hemodiluted.       Erythropoiesis: Markedly decreased and left shifted.       Granulopoiesis: Normal.       Megakaryocytes: Present. Morphology: Normal.    TOUCH PREP: Submitted       Specimen: Paucicellular.       Comments: Similar to aspirate smear.    ASPIRATE CLOT: Submitted                           Specimen: Pacuspicular.       Cellularity: 60%         Estimated M:E ratio: Consistent with aspirate smear.       Megakaryocytes: Adequate. Morphology: Normal.       Comments: Presence of few interstitial lymphoid aggregates, some admixed with minute granulomas.    CORE BIOPSY: Submitted                            Specimen: Limited due to marked hemorrhagic disruption artifact.       Cellularity: 50%       Estimated M:E ratio: Consistent with aspirate smear.       Bony trabeculae: Normal.       Megakaryocytes: Adequate. Morphology: Normal.          Comments: Presence of few interstitial lymphoid aggregates, some admixed with minute granulomas.    SPECIAL STAINS performed at [facility]:       Iron: Storage iron decreased but present; no ring sideroblasts identified.       GMS: Negative.       AFB: Negative.    IMMUNOHISTOCHEMISTRY performed at [facility] on block B1:   MPO: Highlights myeloid cells.    E-cadherin: Highlights scattered early erythroid precursors.   CD3: Highlights scattered and majority of lymphocytes in aggregates.   CD20: Highlights scattered and very few cells in the aggregates.    CD5: Similar to CD3.    CD10: Predominantly negative in lymphocytes.   BCL6: Predominantly negative in lymphocytes.   BCL2: Highlights lymphocytes.   Cyclin D1: Highlights scattered histiocytes.   SOX11: Negative.   : Highlights plasma cells (<1%)   Kappa and lambda (IHC): No light chain restriction (Kappa:Lambda=1:1)   Kappa and lambda (ARACELI): No light chain restriction (Kappa:Lambda=1:1)   CD30: Highlights few scattered immunoblasts.    CD15: Highlights many granulocytes.   EDWIN: Negative.    ALK: Negative.    FLOW CYTOMETRY: Performed at [facility].  Per report, a monoclonal lambda moderate, CD19 positive B-cell population (6% of total) was detected expressing CD11c, CD20, CD44, CD52, and FMC7.  They do not express CD5 or CD10.  There was a mixed population of myeloid cells (72% of total) and T cells account for 22%.  There was no increase in blasts.    CYTOGENETICS/ MOLECULAR: Per note, cytogenetics, FISH for AML, and next generation sequencing testing has been sent and results pending at the time of this review.    Immunostains were performed in addition to flow cytometry to fully characterize the phenotype, architecture, and extent of the marrow population(s).  This was medically necessary for the best possible diagnosis.         Gross Description   Date Value Ref Range Status   03/22/2024   Final    A. OUTSIDE BLOCK(S)/SLIDE(S).  Received from [address] [address]  [address] [place], [place] [address] are Four (4) Microscopic slides labeled [id number] & Block A1  B. OUTSIDE SLIDES , PARTS AFTER A.  Received from [facility] [address] [place] [address] are Eleven (11) Microscopic slides labeled [id number] & Block B1

## 2024-04-11 ENCOUNTER — APPOINTMENT (OUTPATIENT)
Dept: HEMATOLOGY/ONCOLOGY | Facility: CLINIC | Age: 79
End: 2024-04-11
Payer: COMMERCIAL

## 2024-04-29 ENCOUNTER — TELEPHONE (OUTPATIENT)
Dept: HEMATOLOGY/ONCOLOGY | Facility: CLINIC | Age: 79
End: 2024-04-29

## 2024-05-16 ENCOUNTER — TELEMEDICINE (OUTPATIENT)
Dept: HEMATOLOGY/ONCOLOGY | Facility: CLINIC | Age: 79
End: 2024-05-16
Payer: COMMERCIAL

## 2024-05-16 DIAGNOSIS — D61.01 PURE RED CELL APLASIA (MULTI): Primary | ICD-10-CM

## 2024-05-16 PROCEDURE — 1159F MED LIST DOCD IN RCRD: CPT | Performed by: INTERNAL MEDICINE

## 2024-05-16 PROCEDURE — 99213 OFFICE O/P EST LOW 20 MIN: CPT | Performed by: INTERNAL MEDICINE

## 2024-05-16 NOTE — ASSESSMENT & PLAN NOTE
5/16/2024: Patient has been diligently sending his laboratory studies.  At this point in time his hemoglobin has had a near to complete response and has fully improved.  Primary issues now are actually adverse side effects to steroids, we reviewed his progress for steroid taper and side effects, but also fatigue as he comes onto lower doses. Notably, he reports some early proximal muscle weakness.  We discussed a possible taper plan:  5/18 - start 20mg/day  5/25 - start 15mg/day  6/1 - start 10mg/day  6/8 - start 5mg/day  6/15 - start 5mg every other day   6/22 - STOP    Reported that he had a CT neck that demonstrated no evidence of thymoma    Diagnostics:  - none additoinal   Treatment:  -Continue, complete prednisone taper (Dr. Webb's office has been following - can slow CBCs)  Monitoring:   - can consider slowing CBCs to every 2-4 weeks now, or as indicated  Toxicity management:  - advised on exercise and resistance training    Will have virtual follow up in 4-6 weeks to check on progress

## 2024-05-16 NOTE — PROGRESS NOTES
Patient ID: Leopoldo Angel is a 79 y.o. male.  Referring Physician: No referring provider defined for this encounter.  Primary Care Provider: No primary care provider on file.    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  Retic count was profoundly low. Initial evaluation with normal folate, normal to high B12, high ferritin, high to normal iron,  parvovirus B19 serology positive for IgG but not IgM    Notably, haptoglobin low at < 10, LDH slightly high, but negative DAPHNE and bilirubin normal.     Had a bone marrow biopsy completed 3/18 demonstrating:   BONE MARROW CLOT WITH ASPIRATE AND CORE WITH TOUCH PREP, SITE UNSPECIFIED ([facility], [id number], 3/18/2024):     -- HYPERCELLULAR BONE MARROW (50-60%) WITH MARKED ERYTHROID HYPOPLASIA AND SCATTERED INTERSTITIAL LYMPHOID AGGREGATES, SOME WITH MINUTE GRANULOMAS, SEE NOTE.   NOTE: Per report, flow cytometry detected a small monoclonal lambda-restricted, CD5-negative, [id number]-[id number] B-cell population (6% of total events. The aggregates seen are predominantly composed of small T lymphocytes. 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 The marked erythroid hypoplasia with left shifted erythropoiesis are suggestive of red cell aplasia. Clinical and radiological correlation is recommended.                 Current Assessment & Plan     5/16/2024: Patient has been diligently sending his laboratory studies.  At this point in time his hemoglobin has had a near to complete response and has fully improved.  Primary issues now are actually adverse side effects to steroids, we  "reviewed his progress for steroid taper and side effects    Diagnostics:  - follow up karyotype  - parvovirus DNA based assay  - CT chest - r/o thymoma  Treatment:  -Continue, complete prednisone taper (Dr. Webb's office has been following)  Monitoring:   - per Dr. Webb's office    Will have virtual follow up in 6-8 weeks                          Oncology History    No history exists.            Subjective       History of Present Illness:  Now on the 4th day of the 30mg of prednisone. He was wondering about.  Still feels a little different on the 30mg.  Was very hungry on the high dose of prednisone (would eat \"anything that wouldn't eat me\").  At this lower dose.  Energy level is OK - but he's noting that he's definitely more.     He is still have trouble sleeping, but this was happening throughout the steroids.      He had a few questions: has been 7 months since the covid booster.  Wondering about avoiding vaccines - I recommend waiting until he's been off steroids for several months.      Will stay on an every other week monitoring plan.     We reviewed:   Starting on Saturday, he will take his 1st 20mg dose - will take for one week, then decrease by 5mg every week.           Reviewed and Past Medical History, Past Surgical History, Family History, and Social History:    Review of Systems    Home Medications and Adherence Reviewed with Patient.       Objective      VS:  There were no vitals taken for this visit.  BSA: There is no height or weight on file to calculate BSA.    Physical Exam    Laboratory:  Lab on 04/01/2024   Component Date Value Ref Range Status    WBC 04/01/2024 5.3  4.4 - 11.3 x10*3/uL Final    nRBC 04/01/2024 0.0  0.0 - 0.0 /100 WBCs Final    RBC 04/01/2024 3.18 (L)  4.50 - 5.90 x10*6/uL Final    Hemoglobin 04/01/2024 9.3 (L)  13.5 - 17.5 g/dL Final    Hematocrit 04/01/2024 27.6 (L)  41.0 - 52.0 % Final    MCV 04/01/2024 87  80 - 100 fL Final    MCH 04/01/2024 29.2  26.0 - 34.0 pg Final    " MCHC 04/01/2024 33.7  32.0 - 36.0 g/dL Final    RDW 04/01/2024 14.4  11.5 - 14.5 % Final    Platelets 04/01/2024 159  150 - 450 x10*3/uL Final    Neutrophils % 04/01/2024 49.1  40.0 - 80.0 % Final    Immature Granulocytes %, Automated 04/01/2024 0.0  0.0 - 0.9 % Final    Lymphocytes % 04/01/2024 33.5  13.0 - 44.0 % Final    Monocytes % 04/01/2024 15.7  2.0 - 10.0 % Final    Eosinophils % 04/01/2024 1.3  0.0 - 6.0 % Final    Basophils % 04/01/2024 0.4  0.0 - 2.0 % Final    Neutrophils Absolute 04/01/2024 2.62  1.60 - 5.50 x10*3/uL Final    Immature Granulocytes Absolute, Au* 04/01/2024 0.00  0.00 - 0.50 x10*3/uL Final    Lymphocytes Absolute 04/01/2024 1.79  0.80 - 3.00 x10*3/uL Final    Monocytes Absolute 04/01/2024 0.84 (H)  0.05 - 0.80 x10*3/uL Final    Eosinophils Absolute 04/01/2024 0.07  0.00 - 0.40 x10*3/uL Final    Basophils Absolute 04/01/2024 0.02  0.00 - 0.10 x10*3/uL Final    Retic % 04/01/2024 0.2 (L)  0.5 - 2.0 % Final    Retic Absolute 04/01/2024 0.006 (L)  0.017 - 0.110 x10*6/uL Final    Reticulocyte Hemoglobin 04/01/2024 32  28 - 38 pg Final    Immature Retic fraction 04/01/2024 1.4  <=16.0 % Final    Erythropoietin 04/01/2024 835 (H)  4 - 27 mU/mL Final    Case Report 04/01/2024    Final                    Value:Flow Cytometry                                    Case: [id number]                                   Authorizing Provider:  Philip Tovar MD   Collected:           04/01/2024 0242              Ordering Location:     [facility]   Received:            04/01/2024 3323              Pathologist:           Viraj Goodwin MD                                                         Specimen:    Blood, Venous                                                                              Diagnosis 04/01/2024    Final                    Value:This result contains rich text formatting which cannot be displayed here.      04/01/2024    Final                    Value:This result  contains rich text formatting which cannot be displayed here.    Cell Populations 04/01/2024    Final                    Value:This result contains rich text formatting which cannot be displayed here.    Flow Differential 04/01/2024    Final                    Value:This result contains rich text formatting which cannot be displayed here.    Flow Test Ordered 04/01/2024 Low Grade Panel  not established Final    Specimen Viability 04/01/2024 Acceptable  not established Final    Cell Count 04/01/2024 5.30  not established x10*3/uL Final    Number of Cells Collected 04/01/2024 100,000.00  not established per tube Final    Methodology 04/01/2024    Final                    Value:This result contains rich text formatting which cannot be displayed here.    Extra Tube 04/01/2024 Hold for add-ons.   Final               Philip Tovar MD

## 2024-05-16 NOTE — LETTER
May 20, 2024     Armand Oconnell MD  [address]  [facility] [address]    Patient: Leopoldo Angel   YOB: 1945   Date of Visit: 5/16/2024       Dear Dr. Armand Oconnell MD:    I saw our mutual patient Leopoldo Angel for an interval visit in the malignant hematology clinic at [facility].  Please see below for my notes from this encounter. Please do not hesitate to call me if you have any questions. I look forward to continuing to follow your patient along with you.      Sincerely,     Philip Tovar MD      CC: No Recipients  ______________________________________________________________________________________    Patient ID: Leopoldo Angel is a 79 y.o. male.  Referring Physician: No referring provider defined for this encounter.  Primary Care Provider: No primary care provider on file.    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  Retic count was profoundly low. Initial evaluation with normal folate, normal to high B12, high ferritin, high to normal iron,  parvovirus B19 serology positive for IgG but not IgM    Notably, haptoglobin low at < 10, LDH slightly high, but negative DAPHNE and bilirubin normal.     Had a bone marrow biopsy completed 3/18 demonstrating:   BONE MARROW CLOT WITH ASPIRATE AND CORE WITH TOUCH PREP, SITE UNSPECIFIED ([facility], [id number], 3/18/2024):     -- HYPERCELLULAR BONE MARROW (50-60%) WITH MARKED ERYTHROID HYPOPLASIA AND SCATTERED INTERSTITIAL LYMPHOID AGGREGATES, SOME WITH MINUTE GRANULOMAS, SEE NOTE.   NOTE: Per report, flow cytometry detected a small monoclonal lambda-restricted, CD5-negative, [id number] B-cell population (6% of total events. The aggregates  "seen are predominantly composed of small T lymphocytes. 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 The marked erythroid hypoplasia with left shifted erythropoiesis are suggestive of red cell aplasia. Clinical and radiological correlation is recommended.                 Current Assessment & Plan     5/16/2024: Patient has been diligently sending his laboratory studies.  At this point in time his hemoglobin has had a near to complete response and has fully improved.  Primary issues now are actually adverse side effects to steroids, we reviewed his progress for steroid taper and side effects    Diagnostics:  - follow up karyotype  - parvovirus DNA based assay  - CT chest - r/o thymoma  Treatment:  -Continue, complete prednisone taper (Dr. Webb's office has been following)  Monitoring:   - per Dr. Webb's office    Will have virtual follow up in 6-8 weeks                          Oncology History    No history exists.            Subjective       History of Present Illness:  Now on the 4th day of the 30mg of prednisone. He was wondering about.  Still feels a little different on the 30mg.  Was very hungry on the high dose of prednisone (would eat \"anything that wouldn't eat me\").  At this lower dose.  Energy level is OK - but he's noting that he's definitely more.     He is still have trouble sleeping, but this was happening throughout the steroids.      He had a few questions: has been 7 months since the covid booster.  Wondering about avoiding vaccines - I recommend waiting until he's been off steroids for several months.      Will stay on an every other week monitoring plan.     We reviewed:   Starting on Saturday, he will take his 1st 20mg dose - will take for one week, then decrease by 5mg every week.           Reviewed and Past Medical History, Past Surgical History, Family " History, and Social History:    Review of Systems    Home Medications and Adherence Reviewed with Patient.       Objective      VS:  There were no vitals taken for this visit.  BSA: There is no height or weight on file to calculate BSA.    Physical Exam    Laboratory:  Lab on 04/01/2024   Component Date Value Ref Range Status   • WBC 04/01/2024 5.3  4.4 - 11.3 x10*3/uL Final   • nRBC 04/01/2024 0.0  0.0 - 0.0 /100 WBCs Final   • RBC 04/01/2024 3.18 (L)  4.50 - 5.90 x10*6/uL Final   • Hemoglobin 04/01/2024 9.3 (L)  13.5 - 17.5 g/dL Final   • Hematocrit 04/01/2024 27.6 (L)  41.0 - 52.0 % Final   • MCV 04/01/2024 87  80 - 100 fL Final   • MCH 04/01/2024 29.2  26.0 - 34.0 pg Final   • MCHC 04/01/2024 33.7  32.0 - 36.0 g/dL Final   • RDW 04/01/2024 14.4  11.5 - 14.5 % Final   • Platelets 04/01/2024 159  150 - 450 x10*3/uL Final   • Neutrophils % 04/01/2024 49.1  40.0 - 80.0 % Final   • Immature Granulocytes %, Automated 04/01/2024 0.0  0.0 - 0.9 % Final   • Lymphocytes % 04/01/2024 33.5  13.0 - 44.0 % Final   • Monocytes % 04/01/2024 15.7  2.0 - 10.0 % Final   • Eosinophils % 04/01/2024 1.3  0.0 - 6.0 % Final   • Basophils % 04/01/2024 0.4  0.0 - 2.0 % Final   • Neutrophils Absolute 04/01/2024 2.62  1.60 - 5.50 x10*3/uL Final   • Immature Granulocytes Absolute, Au* 04/01/2024 0.00  0.00 - 0.50 x10*3/uL Final   • Lymphocytes Absolute 04/01/2024 1.79  0.80 - 3.00 x10*3/uL Final   • Monocytes Absolute 04/01/2024 0.84 (H)  0.05 - 0.80 x10*3/uL Final   • Eosinophils Absolute 04/01/2024 0.07  0.00 - 0.40 x10*3/uL Final   • Basophils Absolute 04/01/2024 0.02  0.00 - 0.10 x10*3/uL Final   • Retic % 04/01/2024 0.2 (L)  0.5 - 2.0 % Final   • Retic Absolute 04/01/2024 0.006 (L)  0.017 - 0.110 x10*6/uL Final   • Reticulocyte Hemoglobin 04/01/2024 32  28 - 38 pg Final   • Immature Retic fraction 04/01/2024 1.4  <=16.0 % Final   • Erythropoietin 04/01/2024 835 (H)  4 - 27 mU/mL Final   • Case Report 04/01/2024    Final                     Value:Flow Cytometry                                    Case: [id number]                                   Authorizing Provider:  Philip Tovar MD   Collected:           04/01/2024 1503              Ordering Location:     [facility]   Received:            04/01/2024 1504              Pathologist:           Viraj Goodwin MD                                                         Specimen:    Blood, Venous                                                                             • Diagnosis 04/01/2024    Final                    Value:This result contains rich text formatting which cannot be displayed here.   •   04/01/2024    Final                    Value:This result contains rich text formatting which cannot be displayed here.   • Cell Populations 04/01/2024    Final                    Value:This result contains rich text formatting which cannot be displayed here.   • Flow Differential 04/01/2024    Final                    Value:This result contains rich text formatting which cannot be displayed here.   • Flow Test Ordered 04/01/2024 Low Grade Panel  not established Final   • Specimen Viability 04/01/2024 Acceptable  not established Final   • Cell Count 04/01/2024 5.30  not established x10*3/uL Final   • Number of Cells Collected 04/01/2024 100,000.00  not established per tube Final   • Methodology 04/01/2024    Final                    Value:This result contains rich text formatting which cannot be displayed here.   • Extra Tube 04/01/2024 Hold for add-ons.   Final               Philip Tovar MD

## 2024-06-04 ENCOUNTER — TELEPHONE (OUTPATIENT)
Dept: HEMATOLOGY/ONCOLOGY | Facility: HOSPITAL | Age: 79
End: 2024-06-04
Payer: COMMERCIAL

## 2024-06-04 ENCOUNTER — PATIENT MESSAGE (OUTPATIENT)
Dept: HEMATOLOGY/ONCOLOGY | Facility: CLINIC | Age: 79
End: 2024-06-04
Payer: COMMERCIAL

## 2024-06-04 NOTE — TELEPHONE ENCOUNTER
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  Good news is that he may be improving - we discussed possible causes; and if occurs again with prednisone decrease, we can change the steroid taper.

## 2024-06-06 NOTE — TELEPHONE ENCOUNTER
From: Leopoldo Angel  To: Philip Tovar  Sent: 6/4/2024 2:45 PM EDT  Subject: Relatively acute onset of severe fatigue    Dr. Tovar,  I tried to send a more complete message to Tanja Hsu. There was a network issue (?on my end) so I tried several different methods. Tanja will try to forward my message to you.  This Sunday, June 2, 2024, I was unable to get out of bed because of severe fatigue. I was concerned about my hemoglobin level or possibly an acute viral illness. The only place I could get testing on a Sunday was the [facility]. All testing was normal, and I will try to attach the results to this message.   Text: The above diagnosis and findings were noted on the exam but not discussed at this time with the patient. Detail Level: Simple

## 2024-06-20 ENCOUNTER — TELEMEDICINE (OUTPATIENT)
Dept: HEMATOLOGY/ONCOLOGY | Facility: CLINIC | Age: 79
End: 2024-06-20
Payer: COMMERCIAL

## 2024-06-20 DIAGNOSIS — D61.01 PURE RED CELL APLASIA (MULTI): ICD-10-CM

## 2024-06-20 PROCEDURE — 1159F MED LIST DOCD IN RCRD: CPT | Performed by: INTERNAL MEDICINE

## 2024-06-20 PROCEDURE — 1160F RVW MEDS BY RX/DR IN RCRD: CPT | Performed by: INTERNAL MEDICINE

## 2024-06-20 PROCEDURE — 99213 OFFICE O/P EST LOW 20 MIN: CPT | Performed by: INTERNAL MEDICINE

## 2024-06-20 NOTE — ASSESSMENT & PLAN NOTE
6/20/2024: Virtual visit today - has been sending his laboratory studies, and his most recent labs and was worried that his hgb had decreased to 11.9, still normocytic.  He was wondering about having his B12 and iron studies repeated.  I noted that as they were pretty reasonable recently, they're unlikely to be changed and the timing suggests it's more likely related to the end of the steroid taper.  I explained that mild cytopenias can be common after complete withdrawal of immunosuppression, but the real question is whether it will persist.  It may also remit and return to normal in the coming weeks.    I discussed that continued monitoring, probably with a monthly CBC is warranted at this point (or if needed, if he feels fatigued).  If his hgb drops to less than 10, I probably would recheck, but would also suggest that he would need 2nd line therapy.  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     As of right now, he completed the taper as we had outlined and is continuing on 5mg every other day  with a plan on 6/22 to  STOP    I also discussed potential need for osteoporosis screening sometime next year.     Diagnostics:  - none additoinal at this tome  Treatment:  -complete prednisone taper   Monitoring:   - can consider slowing CBCs to every 2-4 weeks now, or as indicated  Toxicity management:  - advised on exercise and resistance training    Will have virtual follow up in 8 weeks

## 2024-06-20 NOTE — PROGRESS NOTES
Patient ID: Leopoldo Angel is a 79 y.o. male.  Referring Physician: Philip Tovar MD  [id number] [id number]  Primary Care Provider: No primary care provider on file.    Date of Service:  6/20/2024    ASSESSMENT and PLAN:      Problem List Items Addressed This Visit       Pure red cell aplasia (Multi)    Overview     Presented about 1-2 months after SARS-Cov-2 infection treated with molnuparavir in 12/2023 with severe, newly transfusion dependent anemia.  Retic count was profoundly low. Initial evaluation with normal folate, normal to high B12, high ferritin, high to normal iron,  parvovirus B19 serology positive for IgG but not IgM    Notably, haptoglobin low at < 10, LDH slightly high, but negative DAPHNE and bilirubin normal.     Had a bone marrow biopsy completed 3/18 demonstrating:   BONE MARROW CLOT WITH ASPIRATE AND CORE WITH TOUCH PREP, SITE UNSPECIFIED ([facility], [id number], 3/18/2024):     -- HYPERCELLULAR BONE MARROW (50-60%) WITH MARKED ERYTHROID HYPOPLASIA AND SCATTERED INTERSTITIAL LYMPHOID AGGREGATES, SOME WITH MINUTE GRANULOMAS, SEE NOTE.   NOTE: Per report, flow cytometry detected a small monoclonal lambda-restricted, CD5-negative, [id number] B-cell population (6% of total events. The aggregates seen are predominantly composed of small T lymphocytes. 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 The marked erythroid hypoplasia with left shifted erythropoiesis are suggestive of red cell aplasia. Clinical and radiological correlation is recommended.                 Current Assessment & Plan     6/20/2024: Virtual visit today - has been sending his laboratory studies, and his most recent labs and was worried that his hgb had decreased to 11.9, still normocytic.  He was wondering about having his B12 and iron studies repeated.  I  noted that as they were pretty reasonable recently, they're unlikely to be changed and the timing suggests it's more likely related to the end of the steroid taper.  I explained that mild cytopenias can be common after complete withdrawal of immunosuppression, but the real question is whether it will persist.  It may also remit and return to normal in the coming weeks.    I discussed that continued monitoring, probably with a monthly CBC is warranted at this point (or if needed, if he feels fatigued).  If his hgb drops to less than 10, I probably would recheck, but would also suggest that he would need 2nd line therapy.  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     As of right now, he completed the taper as we had outlined and is continuing on 5mg every other day  with a plan on 6/22 to  STOP    I also discussed potential need for osteoporosis screening sometime next year.     Diagnostics:  - none additoinal at this tome  Treatment:  -complete prednisone taper   Monitoring:   - can consider slowing CBCs to every 2-4 weeks now, or as indicated  Toxicity management:  - advised on exercise and resistance training    Will have virtual follow up in 8 weeks             Relevant Orders    Clinic Appointment Request Virtual Est; YOMI HANSEN MD      Verbal consent was requested and obtained from patient on this date for a telehealth visit.    Oncology History    No history exists.        SUBJECTIVE:     History of Present Illness:  Continues to feel well these days - much better on the lower doses of steroids.  He noted that his hemoglobin dropped a little bit at the lower doses of steroids, but was just associated under normal.  From a dyspnea on exertion, energy standpoint he is not noticing this.  He has not had easy bleeding or bruising, overall happy to be on  the lower doses of steroids.        Reviewed past medical, surgical and family history for updates  Reviewed Social history for updates.    Review of Systems   Constitutional:  Negative for activity change, appetite change, chills, diaphoresis, fever and unexpected weight change.   HENT:  Negative for congestion, mouth sores, nosebleeds, rhinorrhea, sinus pressure, sinus pain and sore throat.    Eyes:  Negative for visual disturbance.   Respiratory:  Negative for cough and shortness of breath.    Cardiovascular:  Negative for chest pain and leg swelling.   Gastrointestinal:  Negative for abdominal pain, constipation, diarrhea, nausea and vomiting.   Genitourinary:  Negative for difficulty urinating and flank pain.   Musculoskeletal:  Negative for back pain and joint swelling.   Skin:  Negative for rash.   Neurological:  Negative for weakness, light-headedness, numbness and headaches.   Hematological:  Negative for adenopathy. Does not bruise/bleed easily.   Psychiatric/Behavioral:  Negative for confusion and dysphoric mood. The patient is not nervous/anxious.        Home Medication Adherence:     OBJECTIVE:    This was a virtual visit - no vital signs or physical exam could be performed.       Laboratory:  Lab on 04/01/2024   Component Date Value Ref Range Status    WBC 04/01/2024 5.3  4.4 - 11.3 x10*3/uL Final    nRBC 04/01/2024 0.0  0.0 - 0.0 /100 WBCs Final    RBC 04/01/2024 3.18 (L)  4.50 - 5.90 x10*6/uL Final    Hemoglobin 04/01/2024 9.3 (L)  13.5 - 17.5 g/dL Final    Hematocrit 04/01/2024 27.6 (L)  41.0 - 52.0 % Final    MCV 04/01/2024 87  80 - 100 fL Final    MCH 04/01/2024 29.2  26.0 - 34.0 pg Final    MCHC 04/01/2024 33.7  32.0 - 36.0 g/dL Final    RDW 04/01/2024 14.4  11.5 - 14.5 % Final    Platelets 04/01/2024 159  150 - 450 x10*3/uL Final    Neutrophils % 04/01/2024 49.1  40.0 - 80.0 % Final    Immature Granulocytes %, Automated 04/01/2024 0.0  0.0 - 0.9 % Final    Lymphocytes % 04/01/2024 33.5  13.0 -  44.0 % Final    Monocytes % 04/01/2024 15.7  2.0 - 10.0 % Final    Eosinophils % 04/01/2024 1.3  0.0 - 6.0 % Final    Basophils % 04/01/2024 0.4  0.0 - 2.0 % Final    Neutrophils Absolute 04/01/2024 2.62  1.60 - 5.50 x10*3/uL Final    Immature Granulocytes Absolute, Au* 04/01/2024 0.00  0.00 - 0.50 x10*3/uL Final    Lymphocytes Absolute 04/01/2024 1.79  0.80 - 3.00 x10*3/uL Final    Monocytes Absolute 04/01/2024 0.84 (H)  0.05 - 0.80 x10*3/uL Final    Eosinophils Absolute 04/01/2024 0.07  0.00 - 0.40 x10*3/uL Final    Basophils Absolute 04/01/2024 0.02  0.00 - 0.10 x10*3/uL Final    Retic % 04/01/2024 0.2 (L)  0.5 - 2.0 % Final    Retic Absolute 04/01/2024 0.006 (L)  0.017 - 0.110 x10*6/uL Final    Reticulocyte Hemoglobin 04/01/2024 32  28 - 38 pg Final    Immature Retic fraction 04/01/2024 1.4  <=16.0 % Final    Erythropoietin 04/01/2024 835 (H)  4 - 27 mU/mL Final    Case Report 04/01/2024    Final                    Value:Flow Cytometry                                    [id number]: [id number]                                   Authorizing Provider:  Philip Tovar MD   Collected:           04/01/2024 1503              Order[facility] Location[facility]     [facility]   Received:            04/01/2024 1504              Pathologist:           Viraj Goodwin MD                                                         Specimen:    Blood, Venous                                                                              Diagnosis 04/01/2024    Final                    Value:This result contains rich text formatting which cannot be displayed here.      04/01/2024    Final                    Value:This result contains rich text formatting which cannot be displayed here.    Cell Populations 04/01/2024    Final                    Value:This result contains rich text formatting which cannot be displayed here.    Flow Differential 04/01/2024    Final                    Value:This result contains rich text formatting which  cannot be displayed here.    Flow Test Ordered 04/01/2024 Low Grade Panel  not established Final    Specimen Viability 04/01/2024 Acceptable  not established Final    Cell Count 04/01/2024 5.30  not established x10*3/uL Final    Number of Cells Collected 04/01/2024 100,000.00  not established per tube Final    Methodology 04/01/2024    Final                    Value:This result contains rich text formatting which cannot be displayed here.    Extra Tube 04/01/2024 Hold for add-ons.   Final

## 2024-06-25 ASSESSMENT — ENCOUNTER SYMPTOMS
UNEXPECTED WEIGHT CHANGE: 0
BACK PAIN: 0
LIGHT-HEADEDNESS: 0
CONSTIPATION: 0
HEADACHES: 0
NERVOUS/ANXIOUS: 0
WEAKNESS: 0
DIARRHEA: 0
NUMBNESS: 0
COUGH: 0
ABDOMINAL PAIN: 0
SINUS PRESSURE: 0
ACTIVITY CHANGE: 0
DIFFICULTY URINATING: 0
JOINT SWELLING: 0
DIAPHORESIS: 0
CHILLS: 0
SHORTNESS OF BREATH: 0
CONFUSION: 0
APPETITE CHANGE: 0
ADENOPATHY: 0
SORE THROAT: 0
RHINORRHEA: 0
DYSPHORIC MOOD: 0
NAUSEA: 0
FEVER: 0
BRUISES/BLEEDS EASILY: 0
SINUS PAIN: 0
FLANK PAIN: 0
VOMITING: 0

## 2024-07-10 ENCOUNTER — PATIENT MESSAGE (OUTPATIENT)
Dept: HEMATOLOGY/ONCOLOGY | Facility: CLINIC | Age: 79
End: 2024-07-10
Payer: COMMERCIAL

## 2024-07-10 ENCOUNTER — TELEPHONE (OUTPATIENT)
Dept: HEMATOLOGY/ONCOLOGY | Facility: CLINIC | Age: 79
End: 2024-07-10
Payer: COMMERCIAL

## 2024-07-10 NOTE — TELEPHONE ENCOUNTER
Mr. Angel will need a bone marrow biopsy scheduled- we would like to do it here.      He is on both apixiban and aspirin and will need to hold apixiban for 48 hours.  I'd like to him scheduled for one ASAP next week.      Can we set up phone call with him tomorrow to discuss logistics?  (Ms. Stein, I'm thinking we set him up @ [place] for a marrow since the marrow clinic is already full for next week.

## 2024-07-11 ENCOUNTER — PATIENT MESSAGE (OUTPATIENT)
Dept: HEMATOLOGY/ONCOLOGY | Facility: CLINIC | Age: 79
End: 2024-07-11
Payer: COMMERCIAL

## 2024-07-11 NOTE — PROGRESS NOTES
I called and spoke with Mr. Angel. Updated patient that his bone marrow biopsy has been schedule for 7/18 at 1:15 PM at [facility]. Patient confirmed apt time. Provided education on what to expect during BMB. Advised patient per Yojana NP to hold ASA and Eliquis on 7/17 and 7/18th. No need to be NPO. OK to have 2 visitors. Pt stated he is setting up transportation, provided address of [facility] and instructions to check-in. Pt verbalized understanding and appreciative of call. Advised to call office at [phone] or send [facility] message if another further questions arise.

## 2024-07-15 ENCOUNTER — TELEPHONE (OUTPATIENT)
Dept: HEMATOLOGY/ONCOLOGY | Facility: HOSPITAL | Age: 79
End: 2024-07-15

## 2024-07-18 ENCOUNTER — LAB (OUTPATIENT)
Dept: LAB | Facility: CLINIC | Age: 79
End: 2024-07-18
Payer: COMMERCIAL

## 2024-07-18 ENCOUNTER — PROCEDURE VISIT (OUTPATIENT)
Dept: HEMATOLOGY/ONCOLOGY | Facility: CLINIC | Age: 79
End: 2024-07-18
Payer: COMMERCIAL

## 2024-07-18 VITALS
TEMPERATURE: 96.6 F | OXYGEN SATURATION: 98 % | RESPIRATION RATE: 17 BRPM | DIASTOLIC BLOOD PRESSURE: 76 MMHG | WEIGHT: 163.36 LBS | SYSTOLIC BLOOD PRESSURE: 126 MMHG | HEART RATE: 84 BPM

## 2024-07-18 DIAGNOSIS — D61.01 PURE RED CELL APLASIA (MULTI): Primary | ICD-10-CM

## 2024-07-18 DIAGNOSIS — D61.01 PURE RED CELL APLASIA (MULTI): ICD-10-CM

## 2024-07-18 LAB
BASOPHILS # BLD AUTO: 0 X10*3/UL (ref 0–0.1)
BASOPHILS NFR BLD AUTO: 0 %
EOSINOPHIL # BLD AUTO: 0 X10*3/UL (ref 0–0.4)
EOSINOPHIL NFR BLD AUTO: 0 %
ERYTHROCYTE [DISTWIDTH] IN BLOOD BY AUTOMATED COUNT: 14.7 % (ref 11.5–14.5)
HCT VFR BLD AUTO: 27.5 % (ref 41–52)
HGB BLD-MCNC: 9.2 G/DL (ref 13.5–17.5)
IMM GRANULOCYTES # BLD AUTO: 0 X10*3/UL (ref 0–0.5)
IMM GRANULOCYTES NFR BLD AUTO: 0 % (ref 0–0.9)
LYMPHOCYTES # BLD AUTO: 2.72 X10*3/UL (ref 0.8–3)
LYMPHOCYTES NFR BLD AUTO: 83.2 %
MCH RBC QN AUTO: 31.5 PG (ref 26–34)
MCHC RBC AUTO-ENTMCNC: 33.5 G/DL (ref 32–36)
MCV RBC AUTO: 94 FL (ref 80–100)
MONOCYTES # BLD AUTO: 0.53 X10*3/UL (ref 0.05–0.8)
MONOCYTES NFR BLD AUTO: 16.2 %
NEUTROPHILS # BLD AUTO: 0.02 X10*3/UL (ref 1.6–5.5)
NEUTROPHILS NFR BLD AUTO: 0.6 %
NRBC BLD-RTO: ABNORMAL /100{WBCS}
OVALOCYTES BLD QL SMEAR: NORMAL
PLATELET # BLD AUTO: 125 X10*3/UL (ref 150–450)
RBC # BLD AUTO: 2.92 X10*6/UL (ref 4.5–5.9)
RBC MORPH BLD: NORMAL
WBC # BLD AUTO: 3.1 X10*3/UL (ref 4.4–11.3)

## 2024-07-18 PROCEDURE — 85097 BONE MARROW INTERPRETATION: CPT | Mod: TC | Performed by: NURSE PRACTITIONER

## 2024-07-18 PROCEDURE — 36415 COLL VENOUS BLD VENIPUNCTURE: CPT

## 2024-07-18 PROCEDURE — 38222 DX BONE MARROW BX & ASPIR: CPT | Performed by: NURSE PRACTITIONER

## 2024-07-18 PROCEDURE — 88185 FLOWCYTOMETRY/TC ADD-ON: CPT | Mod: TC | Performed by: NURSE PRACTITIONER

## 2024-07-18 PROCEDURE — 85025 COMPLETE CBC W/AUTO DIFF WBC: CPT

## 2024-07-18 ASSESSMENT — PAIN SCALES - GENERAL: PAINLEVEL: 0-NO PAIN

## 2024-07-18 NOTE — PROGRESS NOTES
Patient ID: Leopoldo Angel is a 79 y.o. male.    Biopsy bone marrow    Date/Time: 7/18/2024 2:22 PM    Performed by: ALANNA Barrios  Authorized by: ALANNA Barrios    Consent:     Consent obtained:  Verbal    Consent given by:  Patient    Risks discussed:  Bleeding, infection and pain    Alternatives discussed:  No treatment  Universal protocol:     Procedure explained and questions answered to patient or proxy's satisfaction: yes      Test results available: yes      Site/side marked: yes      Immediately prior to procedure, a time out was called: yes      Patient identity confirmed:  Verbally with patient and provided demographic data  Pre-procedure details:     Skin preparation:  Povidone-iodine  Sedation:     Sedation type:  None  Anesthesia:     Anesthesia method:  Local infiltration    Local anesthetic:  Lidocaine 1% w/o epi  Procedure specific details:      The left posterior iliac crest was prepped with povidone-iodine.     20 ml of lidocaine 1% local anesthesia infiltrated into the subcutaneous tissue.    Left bone marrow biopsy and left bone marrow aspirate was obtained.     The procedure was tolerated well and there were no complications.    Specimens sent for: Heme path protocol    Post-procedure details:     Procedure completion:  Tolerated

## 2024-07-22 DIAGNOSIS — D61.01 PURE RED CELL APLASIA (MULTI): Primary | ICD-10-CM

## 2024-07-23 LAB
CELL COUNT (BLOOD): 5.21 X10*3/UL
CELL POPULATIONS: NORMAL
DIAGNOSIS: NORMAL
FLOW DIFFERENTIAL: NORMAL
FLOW TEST ORDERED: NORMAL
LAB TEST METHOD: NORMAL
NUMBER OF CELLS COLLECTED: NORMAL PER TUBE
PATH REPORT.COMMENTS IMP SPEC: NORMAL
PATH REPORT.FINAL DX SPEC: NORMAL
PATH REPORT.GROSS SPEC: NORMAL
PATH REPORT.MICROSCOPIC SPEC OTHER STN: NORMAL
PATH REPORT.RELEVANT HX SPEC: NORMAL
PATH REPORT.TOTAL CANCER: NORMAL
PATH REPORT.TOTAL CANCER: NORMAL
SIGNATURE COMMENT: NORMAL
SPECIMEN VIABILITY: NORMAL

## 2024-07-24 LAB
ELECTRONICALLY SIGNED BY: NORMAL
MYELOID NGS RESULTS: NORMAL

## 2024-07-24 NOTE — TUMOR BOARD NOTE
TUMOR BOARD DISCUSSION SUMMARY    PRESENTER: Dr. Philip Tovar    DIAGNOSIS: B cell lymphoproliferative disorder    SUMMARY/PRESENTATION: Leopoldo Angel is a 79 y.o. 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      Information reviewed: Pathology Review    Pathology:   (07/18/24) Bone Marrow Biopsy  Hypercellular bone marrow (60%) with pure red cell aplasia and low level involvement by low-grade CD5-, CD10- B-cell lymphoproliferative disorder. Marked polyclonal T-cell lymphocytosis.    NOTE: The marrow shows a marked paucity of red cell progenitors consistent with a pure red cell aplasia. Additionally, there is a prominent marrow lymphocytosis with a clonal CD5-, CD10- B cell population detected by flow cytometry. Although clonal B cells were detected at roughly 19% of all events by flow cytometry, immunostains reveal only roughly 5% B cells with a marked T cell lymphocytosis noted (roughly 40% of marrow) with vast majority of lymphoid cells T cells. T cells are polyclonal by TRBC1 staining on flow cytometry with no evidence of an abnormal phenotype or increase in gamma/delta T cells. Therefore the T cells appear reactive but markedly increased and may be pathogenically related to the red cell aplasia. Genetic studies are pending to help in the evaluation of the process. Clinical correlation recommended.     RECOMMENDATIONS: Consider Rituximab after repeat systemic imaging           Disclaimer     [facility] tumor board recommendations represent the consensus opinion of physicians present at a weekly patient care conference. The treating [facility] physician is not always present, and many of the physicians formulating the recommendation have not personally seen or examined the patient under discussion. It is understood that the treating [facility] physician considers the expertise of the Tumor Board Recommendation in formulating his/her plan for the patient.  However, in many situations, based on individualized patient considerations, a different plan is determined by the treating physician to be the optimal medical management.     Scribe Attestation  By signing my name below, I, Jaret Murray   attest that this documentation has been prepared under the direction and in the presence of [facility] TUMOR BOARD.

## 2024-07-25 ENCOUNTER — TELEMEDICINE (OUTPATIENT)
Dept: HEMATOLOGY/ONCOLOGY | Facility: CLINIC | Age: 79
End: 2024-07-25
Payer: COMMERCIAL

## 2024-07-25 ENCOUNTER — TUMOR BOARD CONFERENCE (OUTPATIENT)
Dept: HEMATOLOGY/ONCOLOGY | Facility: HOSPITAL | Age: 79
End: 2024-07-25
Payer: COMMERCIAL

## 2024-07-25 DIAGNOSIS — D61.01 PURE RED CELL APLASIA (MULTI): ICD-10-CM

## 2024-07-25 PROCEDURE — 99214 OFFICE O/P EST MOD 30 MIN: CPT | Performed by: INTERNAL MEDICINE

## 2024-07-25 NOTE — LETTER
2024      TO: MD Armand Matta MD  [address] [place] [address] Hematology And Oncology  [place] [phone]       Regarding:   Patient:  :  Visit Date: Leopoldo Angel  1945  2024       Dear Dr. Armand Oconnell MD     I saw our mutual patient Leopoldo Angel for an interval visit in the malignant hematology clinic at [facility].  Please see below for my notes from this encounter. Please do not hesitate to call me if you have any questions. I look forward to continuing to follow your patient along with you.      Sincerely,    Philip Tovar MD         CC: Armand Oconnell MD  [address] [place] [address] Hematology And Oncology  [place] [phone]  Via Fax: [phone]    Armand Oconnell MD  [address] [place] [address] Hematology And Oncology  [place] [phone]       Please see my documentation below:   Patient ID: Leopoldo Angel is a 79 y.o. male.  Referring Physician: [place]  [address] [place] [address]  Primary Care Provider: No primary care provider on file.    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  Retic count was profoundly low. Initial evaluation with normal folate, normal to high B12, high ferritin, high to normal iron,  parvovirus B19 serology positive for IgG but not IgM    Notably, haptoglobin low at < 10, LDH slightly high, but negative DAPHNE and bilirubin normal.     Had a bone marrow biopsy completed 3/18 demonstrating:   BONE MARROW CLOT WITH ASPIRATE AND CORE WITH TOUCH PREP, SITE UNSPECIFIED ([facility], [id number], 3/18/2024):     -- HYPERCELLULAR BONE MARROW (50-60%) WITH MARKED ERYTHROID HYPOPLASIA AND SCATTERED INTERSTITIAL LYMPHOID AGGREGATES, SOME WITH MINUTE  GRANULOMAS, SEE NOTE.   NOTE: Per report, flow cytometry detected a small monoclonal lambda-restricted, CD5-negative, [id number] B-cell population (6% of total events. The aggregates seen are predominantly composed of small T lymphocytes. 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 The marked erythroid hypoplasia with left shifted erythropoiesis are suggestive of red cell aplasia. Clinical and radiological correlation is recommended.    Treatment:   I. Prednisone - 1mg/kg  Initiated in April 2024, patient had a near complete response with hemoglobin approaching 12 by the end of 4 to 6 weeks, subsequently tapered prednisone and came completely off in early July 2020.  Patient struggled with fatigue at lower doses of prednisone especially after going under 20 mg, this improved over time.    Unfortunately, in July 2024 patient's hemoglobin decreased again to less than 10, and he developed a new onset severe neutropenia with an ANC of 0.02.  In the setting of repeat bone marrow biopsy was obtained on 7/18/2024:  BONE MARROW CLOT WITH ASPIRATE AND CORE WITH TOUCH PREP, LEFT ILIAC CREST:       --HYPERCELLULAR BONE MARROW (60%) WITH PURE RED CELL APLASIA AND LOW LEVEL INVOLVEMENT BY LOW-GRADE CD5-, CD10- B CELL LYMPHOPROLIFERATIVE DISORDER, SEE NOTE.  --MARKED POLYCLONAL T CELL LYMPHOCYTOSIS, SEE NOTE     NOTE: The marrow shows a marked paucity of red cell progenitors consistent with a pure red cell aplasia. Additionally, there is a prominent marrow lymphocytosis with a clonal CD5-, CD10- B cell population detected by flow cytometry. Although clonal B cells were detected at roughly 19% of all events by flow cytometry, immunostains reveal only roughly 5% B cells with a marked T cell lymphocytosis noted (roughly 40% of marrow) with vast majority of lymphoid cells T cells. T cells  are polyclonal by TRBC1 staining on flow cytometry with no evidence of an abnormal phenotype or increase in gamma/delta T cells. Therefore the T cells appear reactive but markedly increased and may be pathogenically related to the red cell aplasia. Genetic studies are pending to help in the evaluation of the process. Clinical correlation recommended.               Current Assessment & Plan     7/25/2024: We met virtually today, I reviewed the results of his bone marrow biopsy from last week.  We discussed that overall this is a very similar process to his March bone marrow biopsy, notably the granulocyte cell line is not absent, and the overall pattern is not consistent with aplastic anemia.  Despite the new onset neutropenia, we still recommended treating this is a pure red cell aplasia based on the morphology.  I reviewed discussions at tumor board, morphologically the high T-cell concentrations raise the possibility of a LGL lymphoma, but testing strongly suggest that the T-cell population is polyclonal and therefore this entity is not present.  We are still sending a STAT3 mutation.  Overall consensus was that because the monoclonal B cell population remains, even though this has been present for many years, it is clearly a present lymphoproliferative disorder and should be treated next.    I reviewed rituximab, its risks and benefits.  It has the added benefit of probably being a lower risk medication and cyclosporine which would probably be the next line of therapy we would consider in the setting.    Tumor board had recommended we consider systemic imaging, Dr. Oconnell and I have reviewed that he does have recent CT chest imaging in April, as well as CT abdomen from the late fall which none of which shows any lymphadenopathy.  I reviewed with the patient that his local physicians and I discussed his bone marrow results earlier this afternoon, and they are working on arranging for rituximab to start early next  week.    Diagnostics:  -Follow-up STAT3 mutation testing  -Follow-up pending genetic testing  Treatment:  -Rituximab 375 mg/m2 IV days 1, 8, 15, 22  Monitoring:   -Continuing CBCs locally  Toxicity management:  -No additional supportive care    Will have virtual follow up in 5 weeks to review initial results             Relevant Orders    Clinic Appointment Request Virtual Nitza Tovar MD      Verbal consent was requested and obtained from patient on this date for a telehealth visit.      SUBJECTIVE:     History of Present Illness:  Patient reports ongoing issues with fatigue, though manageable with his current hemoglobin levels.  Does not have any ongoing muscle weakness that is improved.  Is nervous about his neutrophil level, reports that he has not had fevers, chills        Reviewed past medical, surgical and family history for updates  Reviewed Social history for updates.    Review of Systems   Constitutional:  Positive for fatigue. Negative for activity change, appetite change, chills, diaphoresis, fever and unexpected weight change.   HENT:  Negative for congestion, mouth sores, nosebleeds, rhinorrhea, sinus pressure, sinus pain and sore throat.    Eyes:  Negative for visual disturbance.   Respiratory:  Negative for cough and shortness of breath.    Cardiovascular:  Negative for chest pain and leg swelling.   Gastrointestinal:  Negative for abdominal pain, constipation, diarrhea, nausea and vomiting.   Genitourinary:  Negative for difficulty urinating and flank pain.   Musculoskeletal:  Negative for back pain and joint swelling.   Skin:  Negative for rash.   Neurological:  Negative for weakness, light-headedness, numbness and headaches.   Hematological:  Negative for adenopathy. Does not bruise/bleed easily.   Psychiatric/Behavioral:  Negative for confusion and dysphoric mood. The patient is not nervous/anxious.        Home Medication Adherence:     OBJECTIVE:    This was a  virtual visit - no vital signs or physical exam could be performed.       Laboratory:  Procedure Visit on 07/18/2024   Component Date Value Ref Range Status   • Case Report 07/18/2024    Final                    Value:Surgical Pathology                                Case: [id number]                                  Authorizing Provider:  Yojana Stein,     Collected:           07/18/2024 5628                                     APRN-CNP                                                                     Ordering Location:     [facility]   Received:            07/18/2024 8226                                                                                                                Pathologist:           Titi Arevalo MD                                                        Specimens:   A) - BONE MARROW CLOT, LEFT ILIAC CREST                                                             B) - BONE MARROW CORE BIOPSY, LEFT ILIAC CREST                                                      C) - BONE MARROW ASPIRATE, LEFT ILIAC CREST                                               • FINAL DIAGNOSIS 07/18/2024    Final                    Value:A, B & C. BONE MARROW CLOT WITH ASPIRATE AND CORE WITH TOUCH PREP, LEFT ILIAC CREST:      --HYPERCELLULAR BONE MARROW (60%) WITH PURE RED CELL APLASIA AND LOW LEVEL INVOLVEMENT BY LOW-GRADE CD5-, CD10- B CELL LYMPHOPROLIFERATIVE DISORDER, SEE NOTE.  --MARKED POLYCLONAL T CELL LYMPHOCYTOSIS, SEE NOTE    NOTE: The marrow shows a marked paucity of red cell progenitors consistent with a pure red cell aplasia. Additionally, there is a prominent marrow lymphocytosis with a clonal CD5-, CD10- B cell population detected by flow cytometry. Although clonal B cells were detected at roughly 19% of all events by flow cytometry, immunostains reveal only roughly 5% B cells with a marked T cell lymphocytosis noted (roughly 40% of marrow) with vast majority of lymphoid  cells T cells. T cells are polyclonal by TRBC1 staining on flow cytometry with no evidence of an abnormal phenotype or increase in gamma/delta T cells. Therefore the T cells appear reactive but markedly increased and may be                           pathogenically related to the red cell aplasia. Genetic studies are pending to help in the evaluation of the process. Clinical correlation recommended.     • Bone Marrow Differential 07/18/2024    Final                    Value:Cell Type Value Reference Range   Promyelocytes 1.0 1-5 %   Myelocytes 1.0 5-10 %   Metamyelocytes 0.5 10-25 %   Myelocytes/Metamyelocytes 0.0 15-35 %   Bands 0.0 10-20 %   Segmented Neutrophils 2.0 5-30 %   Bands/Segmented Neutrophils 0.0 15-50 %   Eosinophils 0.0 2-4 %   Basophils 0.0 0-1 %        Lymphocytes 80.5 5-25 %   Monocytes 15.0 0-2 %   Plasma Cells 0.0 0-2 %        Blasts 0.0 0-1 %        Total Erythroid 0.0 17-35 %        Total Cells Counted 200    Myeloid/Erythroid Ratio N/a 1.5-4.1        • Microscopic Description 07/18/2024    Final                    Value:PERIPHERAL SMEAR: Submitted              Red cells: Normocytic anemia.        White cells: Absolute neutropenia. Normal        Platelets: Normal, adequate.    ASPIRATE SMEAR: Submitted                      Specimen: Aspicular and hemodilute consisting almost entirely of peripheral blood elements, predominantly small lymphocytes.    TOUCH PREP: Submitted       Specimen: Acellular.        Comments: Similar to aspirate smear.    ASPIRATE CLOT: Submitted                           Specimen: Aspicular    CORE BIOPSY: Submitted                            Specimen: Adequate.       Cellularity: 60%       Estimated M:E ratio: More than 10:1       Bony trabeculae: Normal.       Megakaryocytes: Adequate. Morphology: Normal.         Granulomas: Absent.       Lymphoid aggregates: Present. Many aggregates of small to slightly irregular lymphocytes seen and interstitial lymphocytosis        Comments: Marrow show virtual absence of red cells. Ill-defined histiocytic cells seen.     SPECIAL STAINS:                                   Iron: unable to assess due to absence of particles    IMMUNOHISTOCHEMISTRY:      CD3: Positive in many cells interstitially (40%) and majority of cells in aggregates      CD5: Similar to CD3      CD20: Positve in roughly 5% of cells, minority of cells in aggregates positive      CD23: Negative      Cyclin D1: Negative      SOX11: Negative      : Negative, stains roughly 2-3% plasma cells      Kappa/lambda IHC: few scattered positive cells without light chain skewing.      Kappa/lambda ARACELI: non-contributory      Bcl-6: Negative      Bcl-2: Positive in majority of lymphoid cells      Mum1-Scattered few positive plasma cells (roughly 2%)      MPO: Positive in approximately 30-40% cells      E-Cadherin: Positive in rare red cell progenitors (roughly 1-2%)      Lysozyme (Murmisdase): Similar to MPO      CD34: very rare positive cells, not increased      : Strongly positive in tissue macrophages throughout. Many positive cells    FLOW CYTOMETRY: Performed, see separate report. A                           clonal light chain negative, CD5-,CD10- B cell population was detected (19%). T cells are polyclonal by TRBC1 staining.    Immunostains were performed in addition to flow cytometry to fully characterize the phenotype, architecture, and extent of the marrow population(s).  This was medically necessary for the best possible diagnosis.       • Gross Description 07/18/2024    Final                    Value:A:  Received in formalin, labeled with the patient's name and hospital number, is an irregular fragment of blood clot measuring 0.4 x 0.3 x 0.1 cm. The specimen is submitted in toto in one cassette.  The specimen may not survive processing.  MRS      B:  Received in B-plus fixative, labeled with the patient's name and hospital number, is a cylindrical segment of bone  measuring 1.5 x 0.3 x 0.3 cm. The specimen is submitted in toto in one cassette following decalcification.   MRS     • Case Report 07/23/2024    Final                    Value:Flow Cytometry                                    Case: [id number]                                   Authorizing Provider:  Yojana Stein,     Collected:                                                               APRN-CNP                                                                     Ordering Location:     [facility]   Received:            07/18/2024 1350                                     Dr                                                                           Pathologist:           Titi Arevalo MD                                                        Specimen:    Bone Marrow Aspirate                                                                      • Diagnosis 07/23/2024    Final                    Value:--Immunophenotypic findings consistent with CD5-, CD10- B cell lymphoproliferative disorder, see note.   --No increased or abnormal blast population.   --Paucity of granulocytes and red cell progenitors, see note.    Note: The phenotype is not specific for a B cell lymphoproliferative process. A marginal zone or lymphoplasmacytic lymphoma is favored. The Clinical and morphologic correlation is suggested.         •   07/23/2024    Final                    Value:By the signature on this report, the individual or group listed as making the Final Interpretation/Diagnosis certifies that they have reviewed this case and the staining reactivity of the antibodies and reagents in the analysis were determined to be acceptable. Diagnostic interpretation performed at [facility]     • Cell Populations 07/23/2024    Final                    Value:Abnormal Cell Population: Lymphocytes    Percentage:  19 %        Phenotype   Marker Interpretation      CD1c Positive dim   CD5 Negative   CD10 Negative    CD11c Positive dim   CD13 Negative   CD19 Positive moderate   CD20 Positive moderate-bright   CD22 Positive moderate   CD23 Negative   CD25 Negative   CD34 Negative   CD38 Negative   CD43 Negative   CD45 Positive moderate   CD56 Negative   CD71 Negative   CD79b Dim-negative    Positive dim-moderate   HLA-DR Positive moderate      Kappa Negative   Lambda Dim-negative                 • Flow Differential 07/23/2024    Final                    Value:Lymphocyte: 53 %       CD3+CD4+: 26 % ;  Polyclonal          CD3+CD8+: 27 % ;  Polyclonal          Natural Killer Cells: 5 %         CD19+: 40 %         B Cell Light Chain Expression: Monoclonal         Monocyte: 7 %  No immunophenotypic abnormality was detected.         Granulocyte: 3 %   Few cells present demonstrated evidence of a left-shift with increased CD16 negative and CD11b negative cells.      Blast: 0.10 %   No immunophenotypic abnormality was detected.         • Flow Test Ordered 07/23/2024 Acute Panel  not established Final   • Cell Count 07/23/2024 5.21  not established x10*3/uL Final   • Number of Cells Collected 07/23/2024 100,000.00  not established per tube Final   • Methodology 07/23/2024    Final                    Value:Reference ranges not established.    This test is a multicolor, whole blood lysis assay. It was developed and its performance characteristics determined by the Department of Pathology, [facility], and has not been cleared or approved by the U.S. Food and Drug Administration. The laboratory is regulated under CLIA as qualified to perform high complexity testing. This test is used for clinical purposes. It should not be regarded as investigational or for research.    Immunophenotypic analysis was performed using the following antibodies: 1A: CD45. 1B: CD71, CD1c, , CD34, CD14, CD45. 1C: CD20, CD19, CD10, CD34, CD38, CD45. 1D: CD15, , CD33, CD34, HLA-DR, CD45. 1E: CD8, CD7, CD4, CD34, CD3, CD45. 1F:  CD56, CD13, CD5, CD34, CD2, CD45. 1G: , CD13, , CD11b, CD16, CD45. 1H: Kappa Surface, Lambda Surface, CD9, CD22, CD19, CD45. 1I: CD71, Glycophorin-A, CD9, , CD36, CD45. 1J: , CD34, CD38, CD71, CD19, CD45. 1K: TRBC1, TCR Gamma/Delta, CD4,                           CD8, CD3, CD45. 1L: CD43, CD23, CD1c, CD5, CD19, CD45. 1M: CD25, , CD11c, CD79b, CD19, CD45.         • Myeloid Malignancies Results 07/18/2024    Final                    Value:  DISEASE ASSOCIATED GENOMIC FINDINGS: Not Detected.      INTERPRETATION: No variants are detected in the listed gene regions.  This finding does not exclude the presence of genetic alterations present in gene regions not tested or occurring at a frequency below the limit of detection.    DISCLAIMER:   This assay is designed to detect targeted clinically-relevant single nucleotide variants and insertions and deletions (<30bp) in a select group of genes. This assay has also been designed to detect specific larger insertions and deletions: FLT3 internal tandem duplication (ITD) and CALR Type I mutations. This assay does not distinguish between somatic and germline alterations in analyzed regions. A negative result (mutation not identified) does not rule out the presence of a mutation below the limit of detection of this assay due to low neoplastic cell content, tumor heterogeneity, or the presence of additional mutations in the listed genes which are outside of the target regions in this                           assay. Mutations in some homopolymeric regions (eg. ASXL1 p.S836Koc*12) are not consistently detected by this technology. General population polymorphisms, promoter, synonymous and intronic variants (with the exception of splice variants) are not generally included in this report.    Identification or absence of cancer-associated mutations does not necessarily indicate a response to therapy. Decisions on patient care and treatment must be based on the  independent medical judgment of the treating physician, taking into account all applicable information concerning the patient's condition such as clinical and histopathologic findings, other laboratory findings, and patient preferences. This report includes information from public sources, including scientific and medical literature to better characterize the significance of alterations detected.    This laboratory developed test was developed and its analytical performance characteristics have been determined by [facility]                           Laboratory. This test has not been cleared or approved by the FDA; however, the FDA has determined that such approval is not necessary. The [facility] is certified under the Clinical Laboratory Improvement Amendments of 1988 (CLIA-88) as qualified to perform high complexity testing.    PANEL GENE LIST:  ASXL1(11-12), BRAF(15), CALR(9), CBL(7-9), CSF3R(14,17), DNMT3A(8-12,18-19,22-23), ETV6(2-8), EZH2(15-19), FLT3(14-16,20), GATA2(4-6), IDH1(4), IDH2(4), JAK2(12,14), JAK3(4,13,16), KIT(17), KRAS(2-4), MPL(4,10), MYD88(5), NPM1(11), NRAS(2-3), PHF6(2-10), PTPN11(3,13), RUNX1(4-9), SETBP1(4), SF3B1(14-16), SRSF2(1), TET2(3-11), TP53(2-11), U2AF1(2,6), WT1(7,9), ZRSR2(1-10).    Not all exons are sequenced in their entirety. Exons covered are shown in parenthesis. Genome assembly (hg19) was used for alignment and variant calling.     • Electronically signed and reported* 07/18/2024    Final                    Value:Rosalie Sarah MD

## 2024-07-25 NOTE — PROGRESS NOTES
Patient ID: Leopoldo Angel is a 79 y.o. male.  Referring Physician: Philip Tovar MD  [id number] [id number]  Primary Care Provider: No primary care provider on file.    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  Retic count was profoundly low. Initial evaluation with normal folate, normal to high B12, high ferritin, high to normal iron,  parvovirus B19 serology positive for IgG but not IgM    Notably, haptoglobin low at < 10, LDH slightly high, but negative DAPHNE and bilirubin normal.     Had a bone marrow biopsy completed 3/18 demonstrating:   BONE MARROW CLOT WITH ASPIRATE AND CORE WITH TOUCH PREP, SITE UNSPECIFIED ([facility], [id number], 3/18/2024):     -- HYPERCELLULAR BONE MARROW (50-60%) WITH MARKED ERYTHROID HYPOPLASIA AND SCATTERED INTERSTITIAL LYMPHOID AGGREGATES, SOME WITH MINUTE GRANULOMAS, SEE NOTE.   NOTE: Per report, flow cytometry detected a small monoclonal lambda-restricted, CD5-negative, SF50-rmhniybn B-cell population (6% of total events. The aggregates seen are predominantly composed of small T lymphocytes. 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 The marked erythroid hypoplasia with left shifted erythropoiesis are suggestive of red cell aplasia. Clinical and radiological correlation is recommended.    Treatment:   I. Prednisone - 1mg/kg  Initiated in April 2024, patient had a near complete response with hemoglobin approaching 12 by the end of 4 to 6 weeks, subsequently tapered prednisone and came completely off in early July 2020.  Patient struggled with fatigue at lower doses  of prednisone especially after going under 20 mg, this improved over time.    Unfortunately, in July 2024 patient's hemoglobin decreased again to less than 10, and he developed a new onset severe neutropenia with an ANC of 0.02.  In the setting of repeat bone marrow biopsy was obtained on 7/18/2024:  BONE MARROW CLOT WITH ASPIRATE AND CORE WITH TOUCH PREP, LEFT ILIAC CREST:       --HYPERCELLULAR BONE MARROW (60%) WITH PURE RED CELL APLASIA AND LOW LEVEL INVOLVEMENT BY LOW-GRADE CD5-, CD10- B CELL LYMPHOPROLIFERATIVE DISORDER, SEE NOTE.  --MARKED POLYCLONAL T CELL LYMPHOCYTOSIS, SEE NOTE     NOTE: The marrow shows a marked paucity of red cell progenitors consistent with a pure red cell aplasia. Additionally, there is a prominent marrow lymphocytosis with a clonal CD5-, CD10- B cell population detected by flow cytometry. Although clonal B cells were detected at roughly 19% of all events by flow cytometry, immunostains reveal only roughly 5% B cells with a marked T cell lymphocytosis noted (roughly 40% of marrow) with vast majority of lymphoid cells T cells. T cells are polyclonal by TRBC1 staining on flow cytometry with no evidence of an abnormal phenotype or increase in gamma/delta T cells. Therefore the T cells appear reactive but markedly increased and may be pathogenically related to the red cell aplasia. Genetic studies are pending to help in the evaluation of the process. Clinical correlation recommended.               Current Assessment & Plan     7/25/2024: We met virtually today, I reviewed the results of his bone marrow biopsy from last week.  We discussed that overall this is a very similar process to his March bone marrow biopsy, notably the granulocyte cell line is not absent, and the overall pattern is not consistent with aplastic anemia.  Despite the new onset neutropenia, we still recommended treating this is a pure red cell aplasia based on the morphology.  I reviewed discussions at tumor board,  morphologically the high T-cell concentrations raise the possibility of a LGL lymphoma, but testing strongly suggest that the T-cell population is polyclonal and therefore this entity is not present.  We are still sending a STAT3 mutation.  Overall consensus was that because the monoclonal B cell population remains, even though this has been present for many years, it is clearly a present lymphoproliferative disorder and should be treated next.    I reviewed rituximab, its risks and benefits.  It has the added benefit of probably being a lower risk medication and cyclosporine which would probably be the next line of therapy we would consider in the setting.    Tumor board had recommended we consider systemic imaging, Dr. Oconnell and I have reviewed that he does have recent CT chest imaging in April, as well as CT abdomen from the late fall which none of which shows any lymphadenopathy.  I reviewed with the patient that his local physicians and I discussed his bone marrow results earlier this afternoon, and they are working on arranging for rituximab to start early next week.    Diagnostics:  -Follow-up STAT3 mutation testing  -Follow-up pending genetic testing  Treatment:  -Rituximab 375 mg/m2 IV days 1, 8, 15, 22  Monitoring:   -Continuing CBCs locally  Toxicity management:  -No additional supportive care    Will have virtual follow up in 5 weeks to review initial results             Relevant Orders    Clinic Appointment Request Virtual Est                 Philip Tovar MD      Verbal consent was requested and obtained from patient on this date for a telehealth visit.      SUBJECTIVE:     History of Present Illness:  Patient reports ongoing issues with fatigue, though manageable with his current hemoglobin levels.  Does not have any ongoing muscle weakness that is improved.  Is nervous about his neutrophil level, reports that he has not had fevers, chills        Reviewed past medical, surgical and family  history for updates  Reviewed Social history for updates.    Review of Systems   Constitutional:  Positive for fatigue. Negative for activity change, appetite change, chills, diaphoresis, fever and unexpected weight change.   HENT:  Negative for congestion, mouth sores, nosebleeds, rhinorrhea, sinus pressure, sinus pain and sore throat.    Eyes:  Negative for visual disturbance.   Respiratory:  Negative for cough and shortness of breath.    Cardiovascular:  Negative for chest pain and leg swelling.   Gastrointestinal:  Negative for abdominal pain, constipation, diarrhea, nausea and vomiting.   Genitourinary:  Negative for difficulty urinating and flank pain.   Musculoskeletal:  Negative for back pain and joint swelling.   Skin:  Negative for rash.   Neurological:  Negative for weakness, light-headedness, numbness and headaches.   Hematological:  Negative for adenopathy. Does not bruise/bleed easily.   Psychiatric/Behavioral:  Negative for confusion and dysphoric mood. The patient is not nervous/anxious.        Home Medication Adherence:     OBJECTIVE:    This was a virtual visit - no vital signs or physical exam could be performed.       Laboratory:  Procedure Visit on 07/18/2024   Component Date Value Ref Range Status    Case Report 07/18/2024    Final                    Value:Surgical Pathology                                Case: [id number]                                  Authorizing Provider:  Yojana Stein,     Collected:           07/18/2024 1358                                     APRN-CNP                                                                     Ordering Location:     [facility]   Received:            07/18/2024 0621                                                                                                                Pathologist:           Titi Arevalo MD                                                        Specimens:   A) - BONE MARROW CLOT, LEFT ILIAC  CREST                                                             B) - BONE MARROW CORE BIOPSY, LEFT ILIAC CREST                                                      C) - BONE MARROW ASPIRATE, LEFT ILIAC CREST                                                FINAL DIAGNOSIS 07/18/2024    Final                    Value:A, B & C. BONE MARROW CLOT WITH ASPIRATE AND CORE WITH TOUCH PREP, LEFT ILIAC CREST:      --HYPERCELLULAR BONE MARROW (60%) WITH PURE RED CELL APLASIA AND LOW LEVEL INVOLVEMENT BY LOW-GRADE CD5-, CD10- B CELL LYMPHOPROLIFERATIVE DISORDER, SEE NOTE.  --MARKED POLYCLONAL T CELL LYMPHOCYTOSIS, SEE NOTE    NOTE: The marrow shows a marked paucity of red cell progenitors consistent with a pure red cell aplasia. Additionally, there is a prominent marrow lymphocytosis with a clonal CD5-, CD10- B cell population detected by flow cytometry. Although clonal B cells were detected at roughly 19% of all events by flow cytometry, immunostains reveal only roughly 5% B cells with a marked T cell lymphocytosis noted (roughly 40% of marrow) with vast majority of lymphoid cells T cells. T cells are polyclonal by TRBC1 staining on flow cytometry with no evidence of an abnormal phenotype or increase in gamma/delta T cells. Therefore the T cells appear reactive but markedly increased and may be                           pathogenically related to the red cell aplasia. Genetic studies are pending to help in the evaluation of the process. Clinical correlation recommended.      Bone Marrow Differential 07/18/2024    Final                    Value:Cell Type Value Reference Range   Promyelocytes 1.0 1-5 %   Myelocytes 1.0 5-10 %   Metamyelocytes 0.5 10-25 %   Myelocytes/Metamyelocytes 0.0 15-35 %   Bands 0.0 10-20 %   Segmented Neutrophils 2.0 5-30 %   Bands/Segmented Neutrophils 0.0 15-50 %   Eosinophils 0.0 2-4 %   Basophils 0.0 0-1 %        Lymphocytes 80.5 5-25 %   Monocytes 15.0 0-2 %   Plasma Cells 0.0 0-2 %        Blasts 0.0  0-1 %        Total Erythroid 0.0 17-35 %        Total Cells Counted 200    Myeloid/Erythroid Ratio N/a 1.5-4.1         Microscopic Description 07/18/2024    Final                    Value:PERIPHERAL SMEAR: Submitted              Red cells: Normocytic anemia.        White cells: Absolute neutropenia. Normal        Platelets: Normal, adequate.    ASPIRATE SMEAR: Submitted                      Specimen: Aspicular and hemodilute consisting almost entirely of peripheral blood elements, predominantly small lymphocytes.    TOUCH PREP: Submitted       Specimen: Acellular.        Comments: Similar to aspirate smear.    ASPIRATE CLOT: Submitted                           Specimen: Aspicular    CORE BIOPSY: Submitted                            Specimen: Adequate.       Cellularity: 60%       Estimated M:E ratio: More than 10:1       Bony trabeculae: Normal.       Megakaryocytes: Adequate. Morphology: Normal.         Granulomas: Absent.       Lymphoid aggregates: Present. Many aggregates of small to slightly irregular lymphocytes seen and interstitial lymphocytosis       Comments: Marrow show virtual absence of red cells. Ill-defined histiocytic cells seen.     SPECIAL STAINS:                                   Iron: unable to assess due to absence of particles    IMMUNOHISTOCHEMISTRY:      CD3: Positive in many cells interstitially (40%) and majority of cells in aggregates      CD5: Similar to CD3      CD20: Positve in roughly 5% of cells, minority of cells in aggregates positive      CD23: Negative      Cyclin D1: Negative      SOX11: Negative      : Negative, stains roughly 2-3% plasma cells      Kappa/lambda IHC: few scattered positive cells without light chain skewing.      Kappa/lambda ARACELI: non-contributory      Bcl-6: Negative      Bcl-2: Positive in majority of lymphoid cells      Mum1-Scattered few positive plasma cells (roughly 2%)      MPO: Positive in approximately 30-40% cells      E-Cadherin: Positive in rare  red cell progenitors (roughly 1-2%)      Lysozyme (Murmisdase): Similar to MPO      CD34: very rare positive cells, not increased      : Strongly positive in tissue macrophages throughout. Many positive cells    FLOW CYTOMETRY: Performed, see separate report. A                           clonal light chain negative, CD5-,CD10- B cell population was detected (19%). T cells are polyclonal by TRBC1 staining.    Immunostains were performed in addition to flow cytometry to fully characterize the phenotype, architecture, and extent of the marrow population(s).  This was medically necessary for the best possible diagnosis.        Gross Description 07/18/2024    Final                    Value:A:  Received in formalin, labeled with the patient's name and hospital number, is an irregular fragment of blood clot measuring 0.4 x 0.3 x 0.1 cm. The specimen is submitted in toto in one cassette.  The specimen may not survive processing.  MRS      B:  Received in B-plus fixative, labeled with the patient's name and hospital number, is a cylindrical segment of bone measuring 1.5 x 0.3 x 0.3 cm. The specimen is submitted in toto in one cassette following decalcification.   MRS      Case Report 07/23/2024    Final                    Value:Flow Cytometry                                    Case: [id number]                                   Authorizing Provider:  Yojana Stein,     Collected:                                                               APRN-CNP                                                                     Ordering Location:     [facility]   Received:            07/18/2024 1350                                                                                                                Pathologist:           Titi Arevalo MD                                                        Specimen:    Bone Marrow Aspirate                                                                        Diagnosis 07/23/2024    Final                    Value:--Immunophenotypic findings consistent with CD5-, CD10- B cell lymphoproliferative disorder, see note.   --No increased or abnormal blast population.   --Paucity of granulocytes and red cell progenitors, see note.    Note: The phenotype is not specific for a B cell lymphoproliferative process. A marginal zone or lymphoplasmacytic lymphoma is favored. The Clinical and morphologic correlation is suggested.            07/23/2024    Final                    Value:By the signature on this report, the individual or group listed as making the Final Interpretation/Diagnosis certifies that they have reviewed this case and the staining reactivity of the antibodies and reagents in the analysis were determined to be acceptable. Diagnostic interpretation performed at [facility] [place]      Cell Populations 07/23/2024    Final                    Value:Abnormal Cell Population: Lymphocytes    Percentage:  19 %        Phenotype   Marker Interpretation      CD1c Positive dim   CD5 Negative   CD10 Negative   CD11c Positive dim   CD13 Negative   CD19 Positive moderate   CD20 Positive moderate-bright   CD22 Positive moderate   CD23 Negative   CD25 Negative   CD34 Negative   CD38 Negative   CD43 Negative   CD45 Positive moderate   CD56 Negative   CD71 Negative   CD79b Dim-negative    Positive dim-moderate   HLA-DR Positive moderate      Kappa Negative   Lambda Dim-negative                  Flow Differential 07/23/2024    Final                    Value:Lymphocyte: 53 %       CD3+CD4+: 26 % ;  Polyclonal          CD3+CD8+: 27 % ;  Polyclonal          Natural Killer Cells: 5 %         CD19+: 40 %         B Cell Light Chain Expression: Monoclonal         Monocyte: 7 %  No immunophenotypic abnormality was detected.         Granulocyte: 3 %   Few cells present demonstrated evidence of a left-shift with increased CD16 negative and CD11b negative cells.      Blast: 0.10 %   No  immunophenotypic abnormality was detected.          Flow Test Ordered 07/23/2024 Acute Panel  not established Final    Cell Count 07/23/2024 5.21  not established x10*3/uL Final    Number of Cells Collected 07/23/2024 100,000.00  not established per tube Final    Methodology 07/23/2024    Final                    Value:Reference ranges not established.    This test is a multicolor, whole blood lysis assay. It was developed and its performance characteristics determined by the Department of Pathology, [facility], and has not been cleared or approved by the U.S. Food and Drug Administration. The laboratory is regulated under CLIA as qualified to perform high complexity testing. This test is used for clinical purposes. It should not be regarded as investigational or for research.    Immunophenotypic analysis was performed using the following antibodies: 1A: CD45. 1B: CD71, CD1c, , CD34, CD14, CD45. 1C: CD20, CD19, CD10, CD34, CD38, CD45. 1D: CD15, , CD33, CD34, HLA-DR, CD45. 1E: CD8, CD7, CD4, CD34, CD3, CD45. 1F: CD56, CD13, CD5, CD34, CD2, CD45. 1G: , CD13, , CD11b, CD16, CD45. 1H: Kappa Surface, Lambda Surface, CD9, CD22, CD19, CD45. 1I: CD71, Glycophorin-A, CD9, , CD36, CD45. 1J: , CD34, CD38, CD71, CD19, CD45. 1K: TRBC1, TCR Gamma/Delta, CD4,                           CD8, CD3, CD45. 1L: CD43, CD23, CD1c, CD5, CD19, CD45. 1M: CD25, , CD11c, CD79b, CD19, CD45.          Myeloid Malignancies Results 07/18/2024    Final                    Value:  DISEASE ASSOCIATED GENOMIC FINDINGS: Not Detected.      INTERPRETATION: No variants are detected in the listed gene regions.  This finding does not exclude the presence of genetic alterations present in gene regions not tested or occurring at a frequency below the limit of detection.    DISCLAIMER:   This assay is designed to detect targeted clinically-relevant single nucleotide variants and insertions and deletions  (<30bp) in a select group of genes. This assay has also been designed to detect specific larger insertions and deletions: FLT3 internal tandem duplication (ITD) and CALR Type I mutations. This assay does not distinguish between somatic and germline alterations in analyzed regions. A negative result (mutation not identified) does not rule out the presence of a mutation below the limit of detection of this assay due to low neoplastic cell content, tumor heterogeneity, or the presence of additional mutations in the listed genes which are outside of the target regions in this                           assay. Mutations in some homopolymeric regions (eg. ASXL1 p.M343Cgb*12) are not consistently detected by this technology. General population polymorphisms, promoter, synonymous and intronic variants (with the exception of splice variants) are not generally included in this report.    Identification or absence of cancer-associated mutations does not necessarily indicate a response to therapy. Decisions on patient care and treatment must be based on the independent medical judgment of the treating physician, taking into account all applicable information concerning the patient's condition such as clinical and histopathologic findings, other laboratory findings, and patient preferences. This report includes information from public sources, including scientific and medical literature to better characterize the significance of alterations detected.    This laboratory developed test was developed and its analytical performance characteristics have been determined by [facility]                           Laboratory. This test has not been cleared or approved by the FDA; however, the FDA has determined that such approval is not necessary. The [facility] is certified under the Clinical Laboratory Improvement Amendments of 1988 (CLIA-88) as qualified to perform high complexity testing.    PANEL GENE LIST:  ASXL1(11-12), BRAF(15),  CALR(9), CBL(7-9), CSF3R(14,17), DNMT3A(8-12,18-19,22-23), ETV6(2-8), EZH2(15-19), FLT3(14-16,20), GATA2(4-6), IDH1(4), IDH2(4), JAK2(12,14), JAK3(4,13,16), KIT(17), KRAS(2-4), MPL(4,10), MYD88(5), NPM1(11), NRAS(2-3), PHF6(2-10), PTPN11(3,13), RUNX1(4-9), SETBP1(4), SF3B1(14-16), SRSF2(1), TET2(3-11), TP53(2-11), U2AF1(2,6), WT1(7,9), ZRSR2(1-10).    Not all exons are sequenced in their entirety. Exons covered are shown in parenthesis. Genome assembly (hg19) was used for alignment and variant calling.      Electronically signed and reported* 07/18/2024    Final                    Value:Rosalie Sarah MD

## 2024-07-26 ASSESSMENT — ENCOUNTER SYMPTOMS
ACTIVITY CHANGE: 0
DIARRHEA: 0
SHORTNESS OF BREATH: 0
CHILLS: 0
FATIGUE: 1
CONSTIPATION: 0
BACK PAIN: 0
SINUS PRESSURE: 0
FEVER: 0
RHINORRHEA: 0
APPETITE CHANGE: 0
HEADACHES: 0
VOMITING: 0
SINUS PAIN: 0
SORE THROAT: 0
ABDOMINAL PAIN: 0
CONFUSION: 0
DYSPHORIC MOOD: 0
ADENOPATHY: 0
NUMBNESS: 0
WEAKNESS: 0
FLANK PAIN: 0
DIFFICULTY URINATING: 0
UNEXPECTED WEIGHT CHANGE: 0
JOINT SWELLING: 0
COUGH: 0
LIGHT-HEADEDNESS: 0
NAUSEA: 0
NERVOUS/ANXIOUS: 0
BRUISES/BLEEDS EASILY: 0
DIAPHORESIS: 0

## 2024-07-26 NOTE — ASSESSMENT & PLAN NOTE
7/25/2024: We met virtually today, I reviewed the results of his bone marrow biopsy from last week.  We discussed that overall this is a very similar process to his March bone marrow biopsy, notably the granulocyte cell line is not absent, and the overall pattern is not consistent with aplastic anemia.  Despite the new onset neutropenia, we still recommended treating this is a pure red cell aplasia based on the morphology.  I reviewed discussions at tumor board, morphologically the high T-cell concentrations raise the possibility of a LGL lymphoma, but testing strongly suggest that the T-cell population is polyclonal and therefore this entity is not present.  We are still sending a STAT3 mutation.  Overall consensus was that because the monoclonal B cell population remains, even though this has been present for many years, it is clearly a present lymphoproliferative disorder and should be treated next.    I reviewed rituximab, its risks and benefits.  It has the added benefit of probably being a lower risk medication and cyclosporine which would probably be the next line of therapy we would consider in the setting.    Tumor board had recommended we consider systemic imaging, Dr. Oconnell and I have reviewed that he does have recent CT chest imaging in April, as well as CT abdomen from the late fall which none of which shows any lymphadenopathy.  I reviewed with the patient that his local physicians and I discussed his bone marrow results earlier this afternoon, and they are working on arranging for rituximab to start early next week.    Diagnostics:  -Follow-up STAT3 mutation testing  -Follow-up pending genetic testing  Treatment:  -Rituximab 375 mg/m2 IV days 1, 8, 15, 22  Monitoring:   -Continuing CBCs locally  Toxicity management:  -No additional supportive care    Will have virtual follow up in 5 weeks to review initial results

## 2024-07-30 LAB
ELECTRONICALLY SIGNED BY: NORMAL
LYMPHOID NGS RESULTS: NORMAL

## 2024-08-01 LAB
BAND RESOLUTION: 400 BANDS
CHROM ANALY OVERALL INTERP-IMP: NORMAL
CHROMOSOME ANALYSIS CELLS ANALYZED: 20 CELLS
CHROMOSOME ANALYSIS CELLS IMAGED: 4 CELLS
CHROMOSOME ANALYSIS HYPERMODAL CELL COUNT: 0 CELLS
CHROMOSOME ANALYSIS HYPOMODAL CELL COUNT: 0 CELLS
CHROMOSOME ANALYSIS MODAL CHROMOSOME NO: 46 CHROMOSOMES
CHROMOSOME ANALYSIS STAINING METHOD: NORMAL
ELECTRONICALLY SIGNED BY CYTOGENETICS: NORMAL
KARYOTYP MAR: 2 CELLS
TOTAL CELLS COUNTED MAR: 20 CELLS

## 2024-08-02 LAB
ELECTRONICALLY SIGNED BY: NORMAL
TCR CLONALITY RESULTS: NORMAL

## 2024-08-06 LAB
CHROM ANALY OVERALL INTERP-IMP: NORMAL
CHROM ANALY OVERALL INTERP-IMP: NORMAL
ELECTRONICALLY COSIGNED BY CYTOGENETICS: NORMAL
ELECTRONICALLY COSIGNED BY CYTOGENETICS: NORMAL
ELECTRONICALLY SIGNED BY CYTOGENETICS: NORMAL
ELECTRONICALLY SIGNED BY CYTOGENETICS: NORMAL
FISH ISCN RESULTS: NORMAL
FISH ISCN RESULTS: NORMAL

## 2024-08-20 ENCOUNTER — TELEPHONE (OUTPATIENT)
Dept: HEMATOLOGY/ONCOLOGY | Facility: HOSPITAL | Age: 79
End: 2024-08-20
Payer: COMMERCIAL

## 2024-08-20 NOTE — TELEPHONE ENCOUNTER
Spoke with Ailyn from [facility]. Mr. Angel was asking if he should get another set of blood work this week (currently getting labs 1/week with Rituxan doses). Advised that he should stick with plan and continue to get blood work once per week, and continue with fourth dose of Rituxan on Monday. Also told her that he has a virtual visit with Dr. Tovar this Thursday, and if there is a change in plan we will let them know.

## 2024-08-20 NOTE — TELEPHONE ENCOUNTER
Called and left message for Ailyn for more information of what is needed. Provided her with my direct number to call back.

## 2024-08-22 ENCOUNTER — APPOINTMENT (OUTPATIENT)
Dept: HEMATOLOGY/ONCOLOGY | Facility: CLINIC | Age: 79
End: 2024-08-22
Payer: COMMERCIAL

## 2024-08-22 ENCOUNTER — TELEMEDICINE (OUTPATIENT)
Dept: HEMATOLOGY/ONCOLOGY | Facility: CLINIC | Age: 79
End: 2024-08-22
Payer: COMMERCIAL

## 2024-08-22 DIAGNOSIS — D61.01 PURE RED CELL APLASIA (MULTI): ICD-10-CM

## 2024-08-22 PROCEDURE — 1159F MED LIST DOCD IN RCRD: CPT | Performed by: INTERNAL MEDICINE

## 2024-08-22 PROCEDURE — 99213 OFFICE O/P EST LOW 20 MIN: CPT | Performed by: INTERNAL MEDICINE

## 2024-08-22 PROCEDURE — 1160F RVW MEDS BY RX/DR IN RCRD: CPT | Performed by: INTERNAL MEDICINE

## 2024-08-22 NOTE — PROGRESS NOTES
Patient ID: Leopoldo Angel is a 79 y.o. male.  Referring Physician: No referring provider defined for this encounter.  Primary Care Provider: No primary care provider on file.    Date of Service:  8/22/2024      Assessment & Plan  Pure red cell aplasia (Multi)  8/22/2024: met with Dr. Angel virtually - reported that rituximab was very well tolerated, but he's been disappointed that he still has fatigue and still has needed some red cell transfusions.  However, neutrophils are better.  I counseled that we should monitor CBC, but be patient to see if RBCs improve in the next 4-8 weeks.     Diagnostics:  -Need to review genetic testing with patient   Treatment:  - compelting Rituximab 375 mg/m2 IV days 1, 8, 15, 22 - last dose on Monday   Monitoring:   -Continuing CBCs locally - neutrophils improved, hgb has not improved  Toxicity management:  -No additional supportive care    Will have virtual follow up in 4-6 weeks to review CBC trends                    Philip Tovar MD      Verbal consent was requested and obtained from patient on this date for a telehealth visit.    History of pure red cell aplasia:  Presented about 1-2 months after SARS-Cov-2 infection treated with molnuparavir in 12/2023 with severe, newly transfusion dependent anemia.  Retic count was profoundly low. Initial evaluation with normal folate, normal to high B12, high ferritin, high to normal iron,  parvovirus B19 serology positive for IgG but not IgM     Notably, haptoglobin low at < 10, LDH slightly high, but negative DAPHNE and bilirubin normal.      Had a bone marrow biopsy completed 3/18 demonstrating:   BONE MARROW CLOT WITH ASPIRATE AND CORE WITH TOUCH PREP, SITE UNSPECIFIED ([facility], [id number], 3/18/2024):     -- HYPERCELLULAR BONE MARROW (50-60%) WITH MARKED ERYTHROID HYPOPLASIA AND SCATTERED INTERSTITIAL LYMPHOID AGGREGATES, SOME WITH MINUTE GRANULOMAS, SEE NOTE.   NOTE: Per report, flow cytometry detected a small  monoclonal lambda-restricted, CD5-negative, [id number] B-cell population (6% of total events. The aggregates seen are predominantly composed of small T lymphocytes. 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 The marked erythroid hypoplasia with left shifted erythropoiesis are suggestive of red cell aplasia. Clinical and radiological correlation is recommended.     Treatment:   I. Prednisone - 1mg/kg  Initiated in April 2024, patient had a near complete response with hemoglobin approaching 12 by the end of 4 to 6 weeks, subsequently tapered prednisone and came completely off in early July 2020.  Patient struggled with fatigue at lower doses of prednisone especially after going under 20 mg, this improved over time.     Unfortunately, in July 2024 patient's hemoglobin decreased again to less than 10, and he developed a new onset severe neutropenia with an ANC of 0.02.  In the setting of repeat bone marrow biopsy was obtained on 7/18/2024:  BONE MARROW CLOT WITH ASPIRATE AND CORE WITH TOUCH PREP, LEFT ILIAC CREST:       --HYPERCELLULAR BONE MARROW (60%) WITH PURE RED CELL APLASIA AND LOW LEVEL INVOLVEMENT BY LOW-GRADE CD5-, CD10- B CELL LYMPHOPROLIFERATIVE DISORDER, SEE NOTE.  --MARKED POLYCLONAL T CELL LYMPHOCYTOSIS, SEE NOTE     NOTE: The marrow shows a marked paucity of red cell progenitors consistent with a pure red cell aplasia. Additionally, there is a prominent marrow lymphocytosis with a clonal CD5-, CD10- B cell population detected by flow cytometry. Although clonal B cells were detected at roughly 19% of all events by flow cytometry, immunostains reveal only roughly 5% B cells with a marked T cell lymphocytosis noted (roughly 40% of marrow) with vast majority of lymphoid cells T cells. T cells are polyclonal by TRBC1 staining on flow cytometry with no evidence of  an abnormal phenotype or increase in gamma/delta T cells. Therefore the T cells appear reactive but markedly increased and may be pathogenically related to the red cell aplasia. Genetic studies are pending to help in the evaluation of the process. Clinical correlation recommended.    SUBJECTIVE:     History of Present Illness:  Patient due for his last dose of rituximab on Monday; overall he is disappointed that he has not felt better, and continues to have fairly significant anemia, though he does note that his neutrophils are gotten much better.  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         Reviewed past medical, surgical and family history for updates  Reviewed Social history for updates.    Review of Systems   Constitutional:  Positive for fatigue. Negative for activity change, appetite change, chills, diaphoresis, fever and unexpected weight change.   HENT:  Negative for congestion, mouth sores, nosebleeds, rhinorrhea, sinus pressure, sinus pain and sore throat.    Eyes:  Negative for visual disturbance.   Respiratory:  Negative for cough and shortness of breath.    Cardiovascular:  Negative for chest pain and leg swelling.   Gastrointestinal:  Negative for abdominal pain, constipation, diarrhea, nausea and vomiting.   Genitourinary:  Negative for difficulty urinating and flank pain.   Musculoskeletal:  Negative for back pain and joint swelling.   Skin:  Negative for rash.   Neurological:  Negative for weakness, light-headedness, numbness and headaches.   Hematological:  Negative for adenopathy. Does not bruise/bleed easily.   Psychiatric/Behavioral:  Negative for confusion and dysphoric mood. The patient is not nervous/anxious.            Reviewed Home Medications & Adherence:     OBJECTIVE:    This was a virtual visit - no vital signs or physical exam could be performed.        Laboratory:  Procedure Visit on 07/18/2024   Component Date Value Ref Range Status    Case Report 07/18/2024    Final                    Value:Surgical Pathology                                Case: [id number]                                  Authorizing Provider:  Yojana Stein,     Collected:           07/18/2024 1358                                     APRN-CNP                                                                     Ordering Location:     [facility]   Received:            07/18/2024 1355                                     Dr                                                                           Pathologist:           Titi Arevalo MD                                                        Specimens:   A) - BONE MARROW CLOT, LEFT ILIAC CREST                                                             B) - BONE MARROW CORE BIOPSY, LEFT ILIAC CREST                                                      C) - BONE MARROW ASPIRATE, LEFT ILIAC CREST                                                FINAL DIAGNOSIS 07/18/2024    Final                    Value:A, B & C. BONE MARROW CLOT WITH ASPIRATE AND CORE WITH TOUCH PREP, LEFT ILIAC CREST:      --HYPERCELLULAR BONE MARROW (60%) WITH PURE RED CELL APLASIA AND LOW LEVEL INVOLVEMENT BY LOW-GRADE CD5-, CD10- B CELL LYMPHOPROLIFERATIVE DISORDER, SEE NOTE.  --MARKED POLYCLONAL T CELL LYMPHOCYTOSIS, SEE NOTE    NOTE: The marrow shows a marked paucity of red cell progenitors consistent with a pure red cell aplasia. Additionally, there is a prominent marrow lymphocytosis with a clonal CD5-, CD10- B cell population detected by flow cytometry. Although clonal B cells were detected at roughly 19% of all events by flow cytometry, immunostains reveal only roughly 5% B cells with a marked T cell lymphocytosis noted (roughly 40% of marrow) with vast majority of lymphoid cells T cells. T cells are polyclonal by TRBC1 staining on flow  cytometry with no evidence of an abnormal phenotype or increase in gamma/delta T cells. Therefore the T cells appear reactive but markedly increased and may be                           pathogenically related to the red cell aplasia. Genetic studies are pending to help in the evaluation of the process. Clinical correlation recommended.      Bone Marrow Differential 07/18/2024    Final                    Value:Cell Type Value Reference Range   Promyelocytes 1.0 1-5 %   Myelocytes 1.0 5-10 %   Metamyelocytes 0.5 10-25 %   Myelocytes/Metamyelocytes 0.0 15-35 %   Bands 0.0 10-20 %   Segmented Neutrophils 2.0 5-30 %   Bands/Segmented Neutrophils 0.0 15-50 %   Eosinophils 0.0 2-4 %   Basophils 0.0 0-1 %        Lymphocytes 80.5 5-25 %   Monocytes 15.0 0-2 %   Plasma Cells 0.0 0-2 %        Blasts 0.0 0-1 %        Total Erythroid 0.0 17-35 %        Total Cells Counted 200    Myeloid/Erythroid Ratio N/a 1.5-4.1         Microscopic Description 07/18/2024    Final                    Value:PERIPHERAL SMEAR: Submitted              Red cells: Normocytic anemia.        White cells: Absolute neutropenia. Normal        Platelets: Normal, adequate.    ASPIRATE SMEAR: Submitted                      Specimen: Aspicular and hemodilute consisting almost entirely of peripheral blood elements, predominantly small lymphocytes.    TOUCH PREP: Submitted       Specimen: Acellular.        Comments: Similar to aspirate smear.    ASPIRATE CLOT: Submitted                           Specimen: Aspicular    CORE BIOPSY: Submitted                            Specimen: Adequate.       Cellularity: 60%       Estimated M:E ratio: More than 10:1       Bony trabeculae: Normal.       Megakaryocytes: Adequate. Morphology: Normal.         Granulomas: Absent.       Lymphoid aggregates: Present. Many aggregates of small to slightly irregular lymphocytes seen and interstitial lymphocytosis       Comments: Marrow show virtual absence of red cells. Ill-defined  histiocytic cells seen.     SPECIAL STAINS:                                   Iron: unable to assess due to absence of particles    IMMUNOHISTOCHEMISTRY:      CD3: Positive in many cells interstitially (40%) and majority of cells in aggregates      CD5: Similar to CD3      CD20: Positve in roughly 5% of cells, minority of cells in aggregates positive      CD23: Negative      Cyclin D1: Negative      SOX11: Negative      : Negative, stains roughly 2-3% plasma cells      Kappa/lambda IHC: few scattered positive cells without light chain skewing.      Kappa/lambda ARACELI: non-contributory      Bcl-6: Negative      Bcl-2: Positive in majority of lymphoid cells      Mum1-Scattered few positive plasma cells (roughly 2%)      MPO: Positive in approximately 30-40% cells      E-Cadherin: Positive in rare red cell progenitors (roughly 1-2%)      Lysozyme (Murmisdase): Similar to MPO      CD34: very rare positive cells, not increased      : Strongly positive in tissue macrophages throughout. Many positive cells    FLOW CYTOMETRY: Performed, see separate report. A                           clonal light chain negative, CD5-,CD10- B cell population was detected (19%). T cells are polyclonal by TRBC1 staining.    Immunostains were performed in addition to flow cytometry to fully characterize the phenotype, architecture, and extent of the marrow population(s).  This was medically necessary for the best possible diagnosis.        Gross Description 07/18/2024    Final                    Value:A:  Received in formalin, labeled with the patient's name and hospital number, is an irregular fragment of blood clot measuring 0.4 x 0.3 x 0.1 cm. The specimen is submitted in toto in one cassette.  The specimen may not survive processing.  MRS      B:  Received in B-plus fixative, labeled with the patient's name and hospital number, is a cylindrical segment of bone measuring 1.5 x 0.3 x 0.3 cm. The specimen is submitted in toto in one  cassette following decalcification.   MRS      Case Report 07/23/2024    Final                    Value:Flow Cytometry                                    Case: [id number]                                   Authorizing Provider:  Yojana Stein,     Collected:                                                               APRN-CNP                                                                     Ordering Location:     [facility]   Received:            07/18/2024 5750                                     Dr                                                                           Pathologist:           Titi Arevalo MD                                                        Specimen:    Bone Marrow Aspirate                                                                       Diagnosis 07/23/2024    Final                    Value:--Immunophenotypic findings consistent with CD5-, CD10- B cell lymphoproliferative disorder, see note.   --No increased or abnormal blast population.   --Paucity of granulocytes and red cell progenitors, see note.    Note: The phenotype is not specific for a B cell lymphoproliferative process. A marginal zone or lymphoplasmacytic lymphoma is favored. The Clinical and morphologic correlation is suggested.            07/23/2024    Final                    Value:By the signature on this report, the individual or group listed as making the Final Interpretation/Diagnosis certifies that they have reviewed this case and the staining reactivity of the antibodies and reagents in the analysis were determined to be acceptable. Diagnostic interpretation performed at [facility]      Cell Populations 07/23/2024    Final                    Value:Abnormal Cell Population: Lymphocytes    Percentage:  19 %        Phenotype   Marker Interpretation      CD1c Positive dim   CD5 Negative   CD10 Negative   CD11c Positive dim   CD13 Negative   CD19 Positive moderate   CD20 Positive  moderate-bright   CD22 Positive moderate   CD23 Negative   CD25 Negative   CD34 Negative   CD38 Negative   CD43 Negative   CD45 Positive moderate   CD56 Negative   CD71 Negative   CD79b Dim-negative    Positive dim-moderate   HLA-DR Positive moderate      Kappa Negative   Lambda Dim-negative                  Flow Differential 07/23/2024    Final                    Value:Lymphocyte: 53 %       CD3+CD4+: 26 % ;  Polyclonal          CD3+CD8+: 27 % ;  Polyclonal          Natural Killer Cells: 5 %         CD19+: 40 %         B Cell Light Chain Expression: Monoclonal         Monocyte: 7 %  No immunophenotypic abnormality was detected.         Granulocyte: 3 %   Few cells present demonstrated evidence of a left-shift with increased CD16 negative and CD11b negative cells.      Blast: 0.10 %   No immunophenotypic abnormality was detected.          Flow Test Ordered 07/23/2024 Acute Panel  not established Final    Cell Count 07/23/2024 5.21  not established x10*3/uL Final    Number of Cells Collected 07/23/2024 100,000.00  not established per tube Final    Methodology 07/23/2024    Final                    Value:Reference ranges not established.    This test is a multicolor, whole blood lysis assay. It was developed and its performance characteristics determined by the Department of Pathology, [facility], and has not been cleared or approved by the U.S. Food and Drug Administration. The laboratory is regulated under CLIA as qualified to perform high complexity testing. This test is used for clinical purposes. It should not be regarded as investigational or for research.    Immunophenotypic analysis was performed using the following antibodies: 1A: CD45. 1B: CD71, CD1c, , CD34, CD14, CD45. 1C: CD20, CD19, CD10, CD34, CD38, CD45. 1D: CD15, , CD33, CD34, HLA-DR, CD45. 1E: CD8, CD7, CD4, CD34, CD3, CD45. 1F: CD56, CD13, CD5, CD34, CD2, CD45. 1G: , CD13, , CD11b, CD16, CD45. 1H:  Kappa Surface, Lambda Surface, CD9, CD22, CD19, CD45. 1I: CD71, Glycophorin-A, CD9, , CD36, CD45. 1J: , CD34, CD38, CD71, CD19, CD45. 1K: TRBC1, TCR Gamma/Delta, CD4,                           CD8, CD3, CD45. 1L: CD43, CD23, CD1c, CD5, CD19, CD45. 1M: CD25, , CD11c, CD79b, CD19, CD45.          Focused Lymphoma NGS Results 07/18/2024    Final                    Value:Specimen:  Bone Marrow Aspirate [id number]      DISEASE ASSOCIATED GENOMIC FINDINGS:   CARD11 p.D357V ([id number] c.[id number]>T)    INTERPRETATION:  CARD11 p.D357V  VAF: 5%  CARD11 mutations have been observed in mantle cell lymphoma, diffuse large B cell lymphoma, and other B and T-cell leukemia/lymphomas (PMID 12443905). CARD11 mutations are rare in chronic lymphocytic leukemia but have been observed in approximately 7% of Waite`s syndrome (PMID 22894806, [id number], [id number], [id number]).    Amplicons with coverage <300x (Gene:Exon): (TP53:5).  A false negative result cannot be excluded in these regions, especially in samples of low neoplastic cell content.    DISCLAIMER:   This assay is designed to detect targeted clinically-relevant single nucleotide variants and insertions and deletions (<30bp) in a select group of genes.  This assay does not distinguish between somatic and germline alterations in analyzed regions.  A negative result (mutation not identified) does not rule out the                           presence of a mutation below the limit of detection of this assay due to low neoplastic cell content, tumor heterogeneity, or the presence of additional mutations in the listed genes which are outside of the target regions in this assay.  General population polymorphisms, promoter, synonymous and intronic variants (with the exception of splice variants) are not generally included in this report.    Identification or absence of cancer-associated mutations does not necessarily indicate a response to therapy. Decisions on patient care and  treatment must be based on the independent medical judgment of the treating physician, taking into account all applicable information concerning the patients condition such as clinical and histopathologic findings, other laboratory findings, and patient preferences. This report includes information from public sources, including scientific and medical literature to better characterize the significance of alterations detected.    This laboratory developed                           test was developed and its analytical performance characteristics have been determined by [facility]. This test has not been cleared or approved by the FDA; however, the FDA has determined that such approval is not necessary. The [facility] is certified under the Clinical Laboratory Improvement Amendments of 1988 (CLIA-88) as qualified to perform high complexity testing.    PANEL GENE LIST:  VIKAS(2-63), BIRC3(6-9), BRAF(15), BTK(11,15), CARD11(3-5,8-9,15), CCND1(1), CD79B(5), CXCR4(2), EGR2(2), FBXW7(9-10,12), IKBKB(7,8), KLF2(2-3), KRAS(2-4), MAP2K1(2-3), PMJ2N02(11,13), MYD88(3-5), NFKBIE(1-2,5), NOTCH1(34 and 3UTR), NRAS(2-3), PLCG2(19-20, 24), POT1(5-10), RPS15(1-4), SF3B1(14-16), STAT3(13, 20-21), STAT5B(16-17), TNFAIP3(1-2, 4-9), TP53(2-11), TRAF2(2-4, 6-8, 11), TRAF3(4-12), XPO1(15).    Not all exons are sequenced in their entirety. Exons covered are shown in parenthesis. Genome assembly (hg19) was used for alignment and variant calling.          Electronically signed and reported* 07/18/2024    Final                    Value:Frida Hardy MD PhD     Myeloid Malignancies Results 07/18/2024    Final                    Value:  DISEASE ASSOCIATED GENOMIC FINDINGS: Not Detected.      INTERPRETATION: No variants are detected in the listed gene regions.  This finding does not exclude the presence of genetic alterations present in gene regions not tested or occurring at a frequency below the limit of detection.    DISCLAIMER:    This assay is designed to detect targeted clinically-relevant single nucleotide variants and insertions and deletions (<30bp) in a select group of genes. This assay has also been designed to detect specific larger insertions and deletions: FLT3 internal tandem duplication (ITD) and CALR Type I mutations. This assay does not distinguish between somatic and germline alterations in analyzed regions. A negative result (mutation not identified) does not rule out the presence of a mutation below the limit of detection of this assay due to low neoplastic cell content, tumor heterogeneity, or the presence of additional mutations in the listed genes which are outside of the target regions in this                           assay. Mutations in some homopolymeric regions (eg. ASXL1 p.D094Rux*12) are not consistently detected by this technology. General population polymorphisms, promoter, synonymous and intronic variants (with the exception of splice variants) are not generally included in this report.    Identification or absence of cancer-associated mutations does not necessarily indicate a response to therapy. Decisions on patient care and treatment must be based on the independent medical judgment of the treating physician, taking into account all applicable information concerning the patient's condition such as clinical and histopathologic findings, other laboratory findings, and patient preferences. This report includes information from public sources, including scientific and medical literature to better characterize the significance of alterations detected.    This laboratory developed test was developed and its analytical performance characteristics have been determined by [facility]                           Laboratory. This test has not been cleared or approved by the FDA; however, the FDA has determined that such approval is not necessary. The [facility] is certified under the Clinical Laboratory Improvement  Amendments of 1988 (CLIA-88) as qualified to perform high complexity testing.    PANEL GENE LIST:  ASXL1(11-12), BRAF(15), CALR(9), CBL(7-9), CSF3R(14,17), DNMT3A(8-12,18-19,22-23), ETV6(2-8), EZH2(15-19), FLT3(14-16,20), GATA2(4-6), IDH1(4), IDH2(4), JAK2(12,14), JAK3(4,13,16), KIT(17), KRAS(2-4), MPL(4,10), MYD88(5), NPM1(11), NRAS(2-3), PHF6(2-10), PTPN11(3,13), RUNX1(4-9), SETBP1(4), SF3B1(14-16), SRSF2(1), TET2(3-11), TP53(2-11), U2AF1(2,6), WT1(7,9), ZRSR2(1-10).    Not all exons are sequenced in their entirety. Exons covered are shown in parenthesis. Genome assembly (hg19) was used for alignment and variant calling.      Electronically signed and reported* 07/18/2024    Final                    Value:Rosalie Sarah MD    [facility] 07/18/2024    Final                    Value:FISH NEGATIVE for t(11;14) (IGH/CCND1), deletion 11q (VIKAS), trisomy 12, deletion 13q/ monosomy 13 and deletion 17p (TP53)      nuc anna(CCND1,IGH)x2[200],(VIKAS,TP53)x2[200],(D12Z3,S26N032,LAMP1)x2[300]      Cytogenetics Interpretation 07/18/2024    Final                    Value:Results Summary  Fluorescence in situ hybridization (FISH) analysis showed NORMAL results with no evidence of VIKAS (11q), translocation 11;14- IGH/CCND1, trisomy 12, 13q14 deletion, monosomy 13 and TP53 (17p) deletion.    Anomaly Result (%) Reference range (%)   11q deletion (VIKAS) 0.0% (10% or greater)   IGH/CCND1 (dual fusion) 0.0% (1% or greater)   IGH/CCND1 (single fusion)  9.5 % (14.5% or greater)   Trisomy 12 (CEP 12) 0.0% (5% or greater)   13q deletion (monoallelic) 0.0% (11% or greater)   13q deletion (biallelic)  0.0% (5.5% or greater)   Monosomy 13 (O25Q066/LAMP1)  1.3 % (1.5% or greater)   17p deletion (TP53) 0.0% (9.5% or greater)     METHODOLOGY   The LSI VIKAS, IGH/CCND1, CEP12, LSI Q23Z199/LAMP1, and LSI TP53 ([email or web address], [place], [place]) was used in this interphase FISH assay. This test should not be used for the detection of minimal  residual disease.  Number of nuclei scored: 200  Number of techs: 2    ANALYTE SPECIFIC REAGENT (ASR) DISCLAIMER  This FISH                           test was developed and its performance characteristics determined by the [facility].  It has not been cleared or approved by the U.S. Food and Drug Administration.  The FDA has determined that such clearance or approval is not necessary.  This test is used for clinical purposes.  It should not be regarded as investigational or for research.  This laboratory is certified under the Clinical Laboratory Improvement Amendments (CLIA) as qualified to perform high complexity laboratory testing.      Electronically signed and reported* 07/18/2024    Final                    Value:Rajinder Poole Ph.D. F[facility], [facility]      Electronically co-signed by 07/18/2024    Final                    Value:Peña Pereyra MD PhD        ISCN 07/18/2024    Final                    Value:FISH NEGATIVE for Monosomy 7 and deletion 7q31     nuc anna([id number])x2[250]      Cytogenetics Interpretation 07/18/2024    Final                    Value:Results Summary  Fluorescence in situ hybridization (FISH) analysis showed NORMAL results with no evidence of deletion 7q/monosomy 7.    Anomaly Result (%) Reference range (%)   Deletion 7q 0.0% (3.6% or greater)   Monosomy 7 0.0% (1.5% or greater)     METHODOLOGY   The CEP 7 (Vysis) was used in this interphase FISH assay. This test should not be used for the detection of minimal residual disease.  Number of nuclei scored: 250  Number of techs: 2    ANALYTE SPECIFIC REAGENT (ASR) DISCLAIMER  This FISH test was developed and its performance characteristics determined by the [facility].  It has not been cleared or approved by the U.[place]. Food and Drug Administration.  The FDA has determined that such clearance or approval is not necessary.  This test is used for clinical purposes.  It should not be regarded as  investigational or for research.  This laboratory is certified under the Clinical Laboratory Improvement Amendments (CLIA) as qualified to perform high                           complexity laboratory testing.      Electronically signed and reported* 07/18/2024    Final                    Value:Rajinder Poole Ph.D. [facility], [facility]      Electronically co-signed by 07/18/2024    Final                    Value:Peña Pereyra MD PhD        Cytogenetics Interpretation 07/18/2024    Final                    Value:Karyotype  46,XY[20] MALE    Interpretation  Cytogenetic analysis of 20 metaphase cells from this 7/18/2024 Bone Marrow specimen revealed only normal metaphases.  Please remember that this analysis does not eliminate the possibility of single gene defects, chromosomal mosaicism involving abnormal cell lines of low frequency, or small chromosomal structural abnormalities.      Cells Counted 07/18/2024 20  cells Final    Cells Analyzed 07/18/2024 20  cells Final    Cells Imaged 07/18/2024 4  cells Final    Cells Karyotyped 07/18/2024 2  cells Final    Modal Chromosome # 07/18/2024 46  chromosomes Final    Hypomodal Chromosome # 07/18/2024 0  cells Final    Hypermodal Chromosome # 07/18/2024 0  cells Final    Staining Method 07/18/2024 GTG   Final    Band Resolution 07/18/2024 400  bands Final    Electronically signed and reported* 07/18/2024    Final                    Value:Rajindre Poole PhD [facility]      T-Cell Receptor Clonality Results 07/18/2024    Final                    Value:  POLYCLONAL. Clonal population is NOT DETECTED in the tested sample.    INTERPRETATION:    A polyclonal result using this assay does not preclude the presence of a clonal rearrangement present below the limit of detection of the assay due to low neoplastic cell content. Results should be interpreted in the context of clinical, histopathologic, and other laboratory findings.    DISCLAIMER:    Limitations:  This method is designed  to detect clonal rearrangements of the TRG locus and will not detect rearrangements in other T Cell Receptor loci. Detection of a clone at or above 2.5% of total mapped reads does not equate to a diagnosis of malignancy; benign clonal processes are not differentiated from malignant clonal processes on this assay. Polyclonal results do not exclude the possibility of a clonal TRG rearrangement present below the limit of detection of the assay or present in other T cell Receptor loci. In addition, false negative results can occur due to mutations in the                           primer-binding sites in the TRG gene. This assay is not designed to detect minimal/measurable residual disease.    Methodology:  Genomic DNA was extracted and primers targeting the variable (V) and joining (J) regions of the T cell receptor gamma (TRG) gene region located on chromosome 7 (7q14) were used to amplify rearranged alleles by polymerase chain reaction (PCR). Next generation sequencing (NGS) was performed and clonal populations were detected by sequence grouping and comparison according to sequence and V and J allele usage. The limit of detection of this assay has been determined to be 2.5% of total mapped reads.    Nucleic acid extraction and testing are performed in the  [facility] located at [address] [place] [address] (CLIA [id number], CAP [id number]).    This laboratory developed test was developed and its analytical performance characteristics have been determined by  [facility]. This test has not been cleared or                           approved by the FDA; however, the FDA has determined that such approval is not necessary. The [facility] is certified under the Clinical Laboratory Improvement Amendments of 1988 (CLIA-88) as qualified to perform high complexity testing.      Electronically signed and reported* 07/18/2024    Final                    Value:Rosalie Sarah MD

## 2024-08-22 NOTE — LETTER
2024      TO: MD Armand Matta MD  [address] [place] [address] Hematology And Oncology  [place] [id number]       Regarding:   Patient:  :  Visit Date: Leopoldo Angel  1945  2024       Dear Dr. Armand Oconnell MD     I saw our mutual patient Leopoldo Angel for an interval visit in the malignant hematology clinic at [facility].  Please see below for my notes from this encounter. Please do not hesitate to call me if you have any questions. I look forward to continuing to follow your patient along with you.      Sincerely,    Philip Tovar MD         CC: Armand Oconnell MD  [address] [place] [address] Hematology And Oncology  [place] [phone]  Via Fax: [phone]    Armand Oconnell MD  [address] [place] [address] Hematology And Oncology  [place] [id number]       Please see my documentation below:   Patient ID: Leopoldo Angel is a 79 y.o. male.  Referring Physician: No referring provider defined for this encounter.  Primary Care Provider: No primary care provider on file.    Date of Service:  2024      Assessment & Plan  Pure red cell aplasia (Multi)  2024: met with Dr. Angel virtually - reported that rituximab was very well tolerated, but he's been disappointed that he still has fatigue and still has needed some red cell transfusions.  However, neutrophils are better.  I counseled that we should monitor CBC, but be patient to see if RBCs improve in the next 4-8 weeks.     Diagnostics:  -Need to review genetic testing with patient   Treatment:  - compelting Rituximab 375 mg/m2 IV days 1, 8, 15, 22 - last dose on Monday   Monitoring:   -Continuing CBCs locally - neutrophils improved, hgb has not improved  Toxicity management:  -No additional supportive care    Will have virtual follow up in 4-6 weeks to review CBC trends                    Philip Tovar MD      Verbal consent was requested and obtained from patient on this date for a  telehealth visit.    History of pure red cell aplasia:  Presented about 1-2 months after SARS-Cov-2 infection treated with molnuparavir in 12/2023 with severe, newly transfusion dependent anemia.  Retic count was profoundly low. Initial evaluation with normal folate, normal to high B12, high ferritin, high to normal iron,  parvovirus B19 serology positive for IgG but not IgM     Notably, haptoglobin low at < 10, LDH slightly high, but negative DAPHNE and bilirubin normal.      Had a bone marrow biopsy completed 3/18 demonstrating:   BONE MARROW CLOT WITH ASPIRATE AND CORE WITH TOUCH PREP, SITE UNSPECIFIED ([facility], [id number], 3/18/2024):     -- HYPERCELLULAR BONE MARROW (50-60%) WITH MARKED ERYTHROID HYPOPLASIA AND SCATTERED INTERSTITIAL LYMPHOID AGGREGATES, SOME WITH MINUTE GRANULOMAS, SEE NOTE.   NOTE: Per report, flow cytometry detected a small monoclonal lambda-restricted, CD5-negative, [id number] B-cell population (6% of total events. The aggregates seen are predominantly composed of small T lymphocytes. 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 The marked erythroid hypoplasia with left shifted erythropoiesis are suggestive of red cell aplasia. Clinical and radiological correlation is recommended.     Treatment:   I. Prednisone - 1mg/kg  Initiated in April 2024, patient had a near complete response with hemoglobin approaching 12 by the end of 4 to 6 weeks, subsequently tapered prednisone and came completely off in early July 2020.  Patient struggled with fatigue at lower doses of prednisone especially after going under 20 mg, this improved over time.     Unfortunately, in July 2024 patient's hemoglobin decreased again to less than 10, and he developed a new onset severe neutropenia with an ANC of 0.02.  In the setting of repeat bone marrow biopsy was  obtained on 7/18/2024:  BONE MARROW CLOT WITH ASPIRATE AND CORE WITH TOUCH PREP, LEFT ILIAC CREST:       --HYPERCELLULAR BONE MARROW (60%) WITH PURE RED CELL APLASIA AND LOW LEVEL INVOLVEMENT BY LOW-GRADE CD5-, CD10- B CELL LYMPHOPROLIFERATIVE DISORDER, SEE NOTE.  --MARKED POLYCLONAL T CELL LYMPHOCYTOSIS, SEE NOTE     NOTE: The marrow shows a marked paucity of red cell progenitors consistent with a pure red cell aplasia. Additionally, there is a prominent marrow lymphocytosis with a clonal CD5-, CD10- B cell population detected by flow cytometry. Although clonal B cells were detected at roughly 19% of all events by flow cytometry, immunostains reveal only roughly 5% B cells with a marked T cell lymphocytosis noted (roughly 40% of marrow) with vast majority of lymphoid cells T cells. T cells are polyclonal by TRBC1 staining on flow cytometry with no evidence of an abnormal phenotype or increase in gamma/delta T cells. Therefore the T cells appear reactive but markedly increased and may be pathogenically related to the red cell aplasia. Genetic studies are pending to help in the evaluation of the process. Clinical correlation recommended.    SUBJECTIVE:     History of Present Illness:  Patient due for his last dose of rituximab on Monday; overall he is disappointed that he has not felt better, and continues to have fairly significant anemia, though he does note that his neutrophils are gotten much better.  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         Reviewed past medical, surgical and family history for updates  Reviewed Social history for updates.    Review of Systems   Constitutional:  Positive for fatigue. Negative for activity change, appetite change, chills, diaphoresis, fever and unexpected weight change.   HENT:  Negative for congestion, mouth sores, nosebleeds, rhinorrhea, sinus pressure,  sinus pain and sore throat.    Eyes:  Negative for visual disturbance.   Respiratory:  Negative for cough and shortness of breath.    Cardiovascular:  Negative for chest pain and leg swelling.   Gastrointestinal:  Negative for abdominal pain, constipation, diarrhea, nausea and vomiting.   Genitourinary:  Negative for difficulty urinating and flank pain.   Musculoskeletal:  Negative for back pain and joint swelling.   Skin:  Negative for rash.   Neurological:  Negative for weakness, light-headedness, numbness and headaches.   Hematological:  Negative for adenopathy. Does not bruise/bleed easily.   Psychiatric/Behavioral:  Negative for confusion and dysphoric mood. The patient is not nervous/anxious.            Reviewed Home Medications & Adherence:     OBJECTIVE:    This was a virtual visit - no vital signs or physical exam could be performed.       Laboratory:  Procedure Visit on 07/18/2024   Component Date Value Ref Range Status   • Case Report 07/18/2024    Final                    Value:Surgical Pathology                                Case: [id number]                                  Authorizing Provider:  Yojana Stein,     Collected:           07/18/2024 1358                                     APRN-CNP                                                                     Ordering Location:     [place]   Received:            07/18/2024 1358                                                                                                                Pathologist:           Titi Arevalo MD                                                        Specimens:   A) - BONE MARROW CLOT, LEFT ILIAC CREST                                                             B) - BONE MARROW CORE BIOPSY, LEFT ILIAC CREST                                                      C) - BONE MARROW ASPIRATE, LEFT ILIAC CREST                                               • FINAL DIAGNOSIS 07/18/2024    Final                     Value:A, B & C. BONE MARROW CLOT WITH ASPIRATE AND CORE WITH TOUCH PREP, LEFT ILIAC CREST:      --HYPERCELLULAR BONE MARROW (60%) WITH PURE RED CELL APLASIA AND LOW LEVEL INVOLVEMENT BY LOW-GRADE CD5-, CD10- B CELL LYMPHOPROLIFERATIVE DISORDER, SEE NOTE.  --MARKED POLYCLONAL T CELL LYMPHOCYTOSIS, SEE NOTE    NOTE: The marrow shows a marked paucity of red cell progenitors consistent with a pure red cell aplasia. Additionally, there is a prominent marrow lymphocytosis with a clonal CD5-, CD10- B cell population detected by flow cytometry. Although clonal B cells were detected at roughly 19% of all events by flow cytometry, immunostains reveal only roughly 5% B cells with a marked T cell lymphocytosis noted (roughly 40% of marrow) with vast majority of lymphoid cells T cells. T cells are polyclonal by TRBC1 staining on flow cytometry with no evidence of an abnormal phenotype or increase in gamma/delta T cells. Therefore the T cells appear reactive but markedly increased and may be                           pathogenically related to the red cell aplasia. Genetic studies are pending to help in the evaluation of the process. Clinical correlation recommended.     • Bone Marrow Differential 07/18/2024    Final                    Value:Cell Type Value Reference Range   Promyelocytes 1.0 1-5 %   Myelocytes 1.0 5-10 %   Metamyelocytes 0.5 10-25 %   Myelocytes/Metamyelocytes 0.0 15-35 %   Bands 0.0 10-20 %   Segmented Neutrophils 2.0 5-30 %   Bands/Segmented Neutrophils 0.0 15-50 %   Eosinophils 0.0 2-4 %   Basophils 0.0 0-1 %        Lymphocytes 80.5 5-25 %   Monocytes 15.0 0-2 %   Plasma Cells 0.0 0-2 %        Blasts 0.0 0-1 %        Total Erythroid 0.0 17-35 %        Total Cells Counted 200    Myeloid/Erythroid Ratio N/a 1.5-4.1        • Microscopic Description 07/18/2024    Final                    Value:PERIPHERAL SMEAR: Submitted              Red cells: Normocytic anemia.        White cells: Absolute  neutropenia. Normal        Platelets: Normal, adequate.    ASPIRATE SMEAR: Submitted                      Specimen: Aspicular and hemodilute consisting almost entirely of peripheral blood elements, predominantly small lymphocytes.    TOUCH PREP: Submitted       Specimen: Acellular.        Comments: Similar to aspirate smear.    ASPIRATE CLOT: Submitted                           Specimen: Aspicular    CORE BIOPSY: Submitted                            Specimen: Adequate.       Cellularity: 60%       Estimated M:E ratio: More than 10:1       Bony trabeculae: Normal.       Megakaryocytes: Adequate. Morphology: Normal.         Granulomas: Absent.       Lymphoid aggregates: Present. Many aggregates of small to slightly irregular lymphocytes seen and interstitial lymphocytosis       Comments: Marrow show virtual absence of red cells. Ill-defined histiocytic cells seen.     SPECIAL STAINS:                                   Iron: unable to assess due to absence of particles    IMMUNOHISTOCHEMISTRY:      CD3: Positive in many cells interstitially (40%) and majority of cells in aggregates      CD5: Similar to CD3      CD20: Positve in roughly 5% of cells, minority of cells in aggregates positive      CD23: Negative      Cyclin D1: Negative      SOX11: Negative      : Negative, stains roughly 2-3% plasma cells      Kappa/lambda IHC: few scattered positive cells without light chain skewing.      Kappa/lambda ARACELI: non-contributory      Bcl-6: Negative      Bcl-2: Positive in majority of lymphoid cells      Mum1-Scattered few positive plasma cells (roughly 2%)      MPO: Positive in approximately 30-40% cells      E-Cadherin: Positive in rare red cell progenitors (roughly 1-2%)      Lysozyme (Murmisdase): Similar to MPO      CD34: very rare positive cells, not increased      : Strongly positive in tissue macrophages throughout. Many positive cells    FLOW CYTOMETRY: Performed, see separate report. A                            clonal light chain negative, CD5-,CD10- B cell population was detected (19%). T cells are polyclonal by TRBC1 staining.    Immunostains were performed in addition to flow cytometry to fully characterize the phenotype, architecture, and extent of the marrow population(s).  This was medically necessary for the best possible diagnosis.       • Gross Description 07/18/2024    Final                    Value:A:  Received in formalin, labeled with the patient's name and hospital number, is an irregular fragment of blood clot measuring 0.4 x 0.3 x 0.1 cm. The specimen is submitted in toto in one cassette.  The specimen may not survive processing.  MRS      B:  Received in B-plus fixative, labeled with the patient's name and hospital number, is a cylindrical segment of bone measuring 1.5 x 0.3 x 0.3 cm. The specimen is submitted in toto in one cassette following decalcification.        • Case Report 07/23/2024    Final                    Value:Flow Cytometry                                    Case: [id number]                                   Authorizing Provider:  Yojana Stein,     Collected:                                                               APRN-CNP                                                                     Ordering Location:     [facility]   Received:            07/18/2024 1350                                                                                                                Pathologist:           Titi Arevalo MD                                                        Specimen:    Bone Marrow Aspirate                                                                      • Diagnosis 07/23/2024    Final                    Value:--Immunophenotypic findings consistent with CD5-, CD10- B cell lymphoproliferative disorder, see note.   --No increased or abnormal blast population.   --Paucity of granulocytes and red cell progenitors, see note.    Note: The  phenotype is not specific for a B cell lymphoproliferative process. A marginal zone or lymphoplasmacytic lymphoma is favored. The Clinical and morphologic correlation is suggested.         •   07/23/2024    Final                    Value:By the signature on this report, the individual or group listed as making the Final Interpretation/Diagnosis certifies that they have reviewed this case and the staining reactivity of the antibodies and reagents in the analysis were determined to be acceptable. Diagnostic interpretation performed at [facility]     • Cell Populations 07/23/2024    Final                    Value:Abnormal Cell Population: Lymphocytes    Percentage:  19 %        Phenotype   Marker Interpretation      CD1c Positive dim   CD5 Negative   CD10 Negative   CD11c Positive dim   CD13 Negative   CD19 Positive moderate   CD20 Positive moderate-bright   CD22 Positive moderate   CD23 Negative   CD25 Negative   CD34 Negative   CD38 Negative   CD43 Negative   CD45 Positive moderate   CD56 Negative   CD71 Negative   CD79b Dim-negative    Positive dim-moderate   HLA-DR Positive moderate      Kappa Negative   Lambda Dim-negative                 • Flow Differential 07/23/2024    Final                    Value:Lymphocyte: 53 %       CD3+CD4+: 26 % ;  Polyclonal          CD3+CD8+: 27 % ;  Polyclonal          Natural Killer Cells: 5 %         CD19+: 40 %         B Cell Light Chain Expression: Monoclonal         Monocyte: 7 %  No immunophenotypic abnormality was detected.         Granulocyte: 3 %   Few cells present demonstrated evidence of a left-shift with increased CD16 negative and CD11b negative cells.      Blast: 0.10 %   No immunophenotypic abnormality was detected.         • Flow Test Ordered 07/23/2024 Acute Panel  not established Final   • Cell Count 07/23/2024 5.21  not established x10*3/uL Final   • Number of Cells Collected 07/23/2024 100,000.00  not established per tube Final   • Methodology  07/23/2024    Final                    Value:Reference ranges not established.    This test is a multicolor, whole blood lysis assay. It was developed and its performance characteristics determined by the Department of Pathology, [facility], and has not been cleared or approved by the U.S. Food and Drug Administration. The laboratory is regulated under CLIA as qualified to perform high complexity testing. This test is used for clinical purposes. It should not be regarded as investigational or for research.    Immunophenotypic analysis was performed using the following antibodies: 1A: CD45. 1B: CD71, CD1c, , CD34, CD14, CD45. 1C: CD20, CD19, CD10, CD34, CD38, CD45. 1D: CD15, , CD33, CD34, HLA-DR, CD45. 1E: CD8, CD7, CD4, CD34, CD3, CD45. 1F: CD56, CD13, CD5, CD34, CD2, CD45. 1G: , CD13, , CD11b, CD16, CD45. 1H: Kappa Surface, Lambda Surface, CD9, CD22, CD19, CD45. 1I: CD71, Glycophorin-A, CD9, , CD36, CD45. 1J: , CD34, CD38, CD71, CD19, CD45. 1K: TRBC1, TCR Gamma/Delta, CD4,                           CD8, CD3, CD45. 1L: CD43, CD23, CD1c, CD5, CD19, CD45. 1M: CD25, , CD11c, CD79b, CD19, CD45.         • Focused Lymphoma NGS Results 07/18/2024    Final                    Value:Specimen:  Bone Marrow Aspirate [id number]      DISEASE ASSOCIATED GENOMIC FINDINGS:   CARD11 p.D357V ([id number] c.1070A>T)    INTERPRETATION:  CARD11 p.D357V  VAF: 5%  CARD11 mutations have been observed in mantle cell lymphoma, diffuse large B cell lymphoma, and other B and T-cell leukemia/lymphomas (PMID 51926774). CARD11 mutations are rare in chronic lymphocytic leukemia but have been observed in approximately 7% of Waite`s syndrome (PMID 78352961, 19606251, 4469404, 17995569).    Amplicons with coverage <300x (Gene:Exon): (TP53:5).  A false negative result cannot be excluded in these regions, especially in samples of low neoplastic cell content.    DISCLAIMER:   This assay is  designed to detect targeted clinically-relevant single nucleotide variants and insertions and deletions (<30bp) in a select group of genes.  This assay does not distinguish between somatic and germline alterations in analyzed regions.  A negative result (mutation not identified) does not rule out the                           presence of a mutation below the limit of detection of this assay due to low neoplastic cell content, tumor heterogeneity, or the presence of additional mutations in the listed genes which are outside of the target regions in this assay.  General population polymorphisms, promoter, synonymous and intronic variants (with the exception of splice variants) are not generally included in this report.    Identification or absence of cancer-associated mutations does not necessarily indicate a response to therapy. Decisions on patient care and treatment must be based on the independent medical judgment of the treating physician, taking into account all applicable information concerning the patients condition such as clinical and histopathologic findings, other laboratory findings, and patient preferences. This report includes information from public sources, including scientific and medical literature to better characterize the significance of alterations detected.    This laboratory developed                           test was developed and its analytical performance characteristics have been determined by [facility]. This test has not been cleared or approved by the FDA; however, the FDA has determined that such approval is not necessary. The [facility] is certified under the Clinical Laboratory Improvement Amendments of 1988 (CLIA-88) as qualified to perform high complexity testing.    PANEL GENE LIST:  VIKAS(2-63), BIRC3(6-9), BRAF(15), BTK(11,15), CARD11(3-5,8-9,15), CCND1(1), CD79B(5), CXCR4(2), EGR2(2), FBXW7(9-10,12), IKBKB(7,8), KLF2(2-3), KRAS(2-4), MAP2K1(2-3), WSR6N92(11,13),  MYD88(3-5), NFKBIE(1-2,5), NOTCH1(34 and 3UTR), NRAS(2-3), PLCG2(19-20, 24), POT1(5-10), RPS15(1-4), SF3B1(14-16), STAT3(13, 20-21), STAT5B(16-17), TNFAIP3(1-2, 4-9), TP53(2-11), TRAF2(2-4, 6-8, 11), TRAF3(4-12), XPO1(15).    Not all exons are sequenced in their entirety. Exons covered are shown in parenthesis. Genome assembly (hg19) was used for alignment and variant calling.         • Electronically signed and reported* 07/18/2024    Final                    Value:Frida Hardy MD PhD    • Myeloid Malignancies Results 07/18/2024    Final                    Value:  DISEASE ASSOCIATED GENOMIC FINDINGS: Not Detected.      INTERPRETATION: No variants are detected in the listed gene regions.  This finding does not exclude the presence of genetic alterations present in gene regions not tested or occurring at a frequency below the limit of detection.    DISCLAIMER:   This assay is designed to detect targeted clinically-relevant single nucleotide variants and insertions and deletions (<30bp) in a select group of genes. This assay has also been designed to detect specific larger insertions and deletions: FLT3 internal tandem duplication (ITD) and CALR Type I mutations. This assay does not distinguish between somatic and germline alterations in analyzed regions. A negative result (mutation not identified) does not rule out the presence of a mutation below the limit of detection of this assay due to low neoplastic cell content, tumor heterogeneity, or the presence of additional mutations in the listed genes which are outside of the target regions in this                           assay. Mutations in some homopolymeric regions (eg. ASXL1 p.I599Bfh*12) are not consistently detected by this technology. General population polymorphisms, promoter, synonymous and intronic variants (with the exception of splice variants) are not generally included in this report.    Identification or absence of cancer-associated mutations does  not necessarily indicate a response to therapy. Decisions on patient care and treatment must be based on the independent medical judgment of the treating physician, taking into account all applicable information concerning the patient's condition such as clinical and histopathologic findings, other laboratory findings, and patient preferences. This report includes information from public sources, including scientific and medical literature to better characterize the significance of alterations detected.    This laboratory developed test was developed and its analytical performance characteristics have been determined by [facility]                           Laboratory. This test has not been cleared or approved by the FDA; however, the FDA has determined that such approval is not necessary. The [facility] is certified under the Clinical Laboratory Improvement Amendments of 1988 (CLIA-88) as qualified to perform high complexity testing.    PANEL GENE LIST:  ASXL1(11-12), BRAF(15), CALR(9), CBL(7-9), CSF3R(14,17), DNMT3A(8-12,18-19,22-23), ETV6(2-8), EZH2(15-19), FLT3(14-16,20), GATA2(4-6), IDH1(4), IDH2(4), JAK2(12,14), JAK3(4,13,16), KIT(17), KRAS(2-4), MPL(4,10), MYD88(5), NPM1(11), NRAS(2-3), PHF6(2-10), PTPN11(3,13), RUNX1(4-9), SETBP1(4), SF3B1(14-16), SRSF2(1), TET2(3-11), TP53(2-11), U2AF1(2,6), WT1(7,9), ZRSR2(1-10).    Not all exons are sequenced in their entirety. Exons covered are shown in parenthesis. Genome assembly (hg19) was used for alignment and variant calling.     • Electronically signed and reported* 07/18/2024    Final                    Value:Rosalie Sarah MD   • [facility] 07[facility]    Final                    Value:FISH NEGATIVE for t(11;14) (IGH/CCND1), deletion 11q (VIKAS), trisomy 12, deletion 13q/ monosomy 13 and deletion 17p (TP53)      nuc anna(CCND1,IGH)x2[200],(VIKAS,TP53)x2[200],(D12Z3,P97S487,LAMP1)x2[300]     • Cytogenetics Interpretation 07/18/2024    Final                     Value:Results Summary  Fluorescence in situ hybridization (FISH) analysis showed NORMAL results with no evidence of VIKAS (11q), translocation 11;14- IGH/CCND1, trisomy 12, 13q14 deletion, monosomy 13 and TP53 (17p) deletion.    Anomaly Result (%) Reference range (%)   11q deletion (VIKAS) 0.0% (10% or greater)   IGH/CCND1 (dual fusion) 0.0% (1% or greater)   IGH/CCND1 (single fusion)  9.5 % (14.5% or greater)   Trisomy 12 (CEP 12) 0.0% (5% or greater)   13q deletion (monoallelic) 0.0% (11% or greater)   13q deletion (biallelic)  0.0% (5.5% or greater)   Monosomy 13 (B92E466/LAMP1)  1.3 % (1.5% or greater)   17p deletion (TP53) 0.0% (9.5% or greater)     METHODOLOGY   The LSI VIKAS, IGH/CCND1, CEP12, LSI I17I776/LAMP1, and LSI TP53 ([employer], [place], [place]) was used in this interphase FISH assay. This test should not be used for the detection of minimal residual disease.  Number of nuclei scored: 200  Number of techs: 2    ANALYTE SPECIFIC REAGENT (ASR) DISCLAIMER  This FISH                           test was developed and its performance characteristics determined by the [facility].  It has not been cleared or approved by the U.S. Food and Drug Administration.  The FDA has determined that such clearance or approval is not necessary.  This test is used for clinical purposes.  It should not be regarded as investigational or for research.  This laboratory is certified under the Clinical Laboratory Improvement Amendments (CLIA) as qualified to perform high complexity laboratory testing.     • Electronically signed and reported* 07/18/2024    Final                    Value:Rajinder Poole Ph.D. FACMG, FCCMG     • Electronically co-signed by 07/18/2024    Final                    Value:Peña Pereyra MD PhD       • [facility] 07/18/2024    Final                    Value:FISH NEGATIVE for Monosomy 7 and deletion 7q31     nuc anna(D7Z1,J3N055)x2[250]     • Cytogenetics Interpretation  07/18/2024    Final                    Value:Results Summary  Fluorescence in situ hybridization (FISH) analysis showed NORMAL results with no evidence of deletion 7q/monosomy 7.    Anomaly Result (%) Reference range (%)   Deletion 7q 0.0% (3.6% or greater)   Monosomy 7 0.0% (1.5% or greater)     METHODOLOGY   The CEP 7 ([employer]) was used in this interphase FISH assay. This test should not be used for the detection of minimal residual disease.  Number of nuclei scored: 250  Number of techs: 2    ANALYTE SPECIFIC REAGENT (ASR) DISCLAIMER  This FISH test was developed and its performance characteristics determined by the [facility].  It has not been cleared or approved by the U.S. Food and Drug Administration.  The FDA has determined that such clearance or approval is not necessary.  This test is used for clinical purposes.  It should not be regarded as investigational or for research.  This laboratory is certified under the Clinical Laboratory Improvement Amendments (CLIA) as qualified to perform high                           complexity laboratory testing.     • Electronically signed and reported* 07/18/2024    Final                    Value:Rajinder Poole Ph.D. FACMG, FCCMG     • Electronically co-signed by 07/18/2024    Final                    Value:Peña Pereyra MD PhD       • Cytogenetics Interpretation 07/18/2024    Final                    Value:Karyotype  46,XY[20] MALE    Interpretation  Cytogenetic analysis of 20 metaphase cells from this 7/18/2024 Bone Marrow specimen revealed only normal metaphases.  Please remember that this analysis does not eliminate the possibility of single gene defects, chromosomal mosaicism involving abnormal cell lines of low frequency, or small chromosomal structural abnormalities.     • Cells Counted 07/18/2024 20  cells Final   • Cells Analyzed 07/18/2024 20  cells Final   • Cells Imaged 07/18/2024 4  cells Final   • Cells Karyotyped 07/18/2024 2   cells Final   • Modal Chromosome # 07/18/2024 46  chromosomes Final   • Hypomodal Chromosome # 07/18/2024 0  cells Final   • Hypermodal Chromosome # 07/18/2024 0  cells Final   • Staining Method 07/18/2024 GTG   Final   • Band Resolution 07/18/2024 400  bands Final   • Electronically signed and reported* 07/18/2024    Final                    Value:Rajinder Poole PhD [facility]     • T-Cell Receptor Clonality Results 07/18/2024    Final                    Value:  POLYCLONAL. Clonal population is NOT DETECTED in the tested sample.    INTERPRETATION:    A polyclonal result using this assay does not preclude the presence of a clonal rearrangement present below the limit of detection of the assay due to low neoplastic cell content. Results should be interpreted in the context of clinical, histopathologic, and other laboratory findings.    DISCLAIMER:    Limitations:  This method is designed to detect clonal rearrangements of the TRG locus and will not detect rearrangements in other T Cell Receptor loci. Detection of a clone at or above 2.5% of total mapped reads does not equate to a diagnosis of malignancy; benign clonal processes are not differentiated from malignant clonal processes on this assay. Polyclonal results do not exclude the possibility of a clonal TRG rearrangement present below the limit of detection of the assay or present in other T cell Receptor loci. In addition, false negative results can occur due to mutations in the                           primer-binding sites in the TRG gene. This assay is not designed to detect minimal/measurable residual disease.    Methodology:  Genomic DNA was extracted and primers targeting the variable (V) and joining (J) regions of the T cell receptor gamma (TRG) gene region located on chromosome 7 (7q14) were used to amplify rearranged alleles by polymerase chain reaction (PCR). Next generation sequencing (NGS) was performed and clonal populations were detected by  sequence grouping and comparison according to sequence and V and J allele usage. The limit of detection of this assay has been determined to be 2.5% of total mapped reads.    Nucleic acid extraction and testing are performed in the  [facility] ([facility]) located at [address] [place] [address] (CLIA [id number], CAP [id number]).    This laboratory developed test was developed and its analytical performance characteristics have been determined by  [facility]. This test has not been cleared or                           approved by the FDA; however, the FDA has determined that such approval is not necessary. The [facility] is certified under the Clinical Laboratory Improvement Amendments of 1988 (CLIA-88) as qualified to perform high complexity testing.     • Electronically signed and reported* 07/18/2024    Final                    Value:Rosalie Sarah MD

## 2024-08-29 ASSESSMENT — ENCOUNTER SYMPTOMS
ABDOMINAL PAIN: 0
SINUS PRESSURE: 0
NERVOUS/ANXIOUS: 0
CHILLS: 0
JOINT SWELLING: 0
ADENOPATHY: 0
FATIGUE: 1
CONSTIPATION: 0
ACTIVITY CHANGE: 0
DIARRHEA: 0
COUGH: 0
VOMITING: 0
BACK PAIN: 0
HEADACHES: 0
NUMBNESS: 0
NAUSEA: 0
RHINORRHEA: 0
SORE THROAT: 0
FEVER: 0
SINUS PAIN: 0
SHORTNESS OF BREATH: 0
UNEXPECTED WEIGHT CHANGE: 0
DIFFICULTY URINATING: 0
DIAPHORESIS: 0
WEAKNESS: 0
APPETITE CHANGE: 0
CONFUSION: 0
DYSPHORIC MOOD: 0
BRUISES/BLEEDS EASILY: 0
FLANK PAIN: 0
LIGHT-HEADEDNESS: 0

## 2024-08-29 NOTE — ASSESSMENT & PLAN NOTE
8/22/2024: met with Dr. Angel virtually - reported that rituximab was very well tolerated, but he's been disappointed that he still has fatigue and still has needed some red cell transfusions.  However, neutrophils are better.  I counseled that we should monitor CBC, but be patient to see if RBCs improve in the next 4-8 weeks.     Diagnostics:  -Need to review genetic testing with patient   Treatment:  - compelting Rituximab 375 mg/m2 IV days 1, 8, 15, 22 - last dose on Monday   Monitoring:   -Continuing CBCs locally - neutrophils improved, hgb has not improved  Toxicity management:  -No additional supportive care    Will have virtual follow up in 4-6 weeks to review CBC trends

## 2024-09-02 LAB
PATH REPORT.ADDENDUM SPEC: NORMAL
PATH REPORT.COMMENTS IMP SPEC: NORMAL
PATH REPORT.FINAL DX SPEC: NORMAL
PATH REPORT.GROSS SPEC: NORMAL
PATH REPORT.MICROSCOPIC SPEC OTHER STN: NORMAL
PATH REPORT.RELEVANT HX SPEC: NORMAL
PATH REPORT.TOTAL CANCER: NORMAL

## 2024-10-03 ENCOUNTER — TELEMEDICINE (OUTPATIENT)
Dept: HEMATOLOGY/ONCOLOGY | Facility: CLINIC | Age: 79
End: 2024-10-03
Payer: COMMERCIAL

## 2024-10-03 DIAGNOSIS — D61.01 PURE RED CELL APLASIA (MULTI): ICD-10-CM

## 2024-10-03 PROCEDURE — 99214 OFFICE O/P EST MOD 30 MIN: CPT | Performed by: INTERNAL MEDICINE

## 2024-10-03 NOTE — LETTER
October 4, 2024     Armand Oconnell MD  [address] [facility]  [facility] [address]    Patient: Leopoldo Angel   YOB: 1945   Date of Visit: 10/3/2024       Dear Dr. Armand Oconnell MD:    I saw our mutual patient Leopoldo Angel for an interval visit in the malignant hematology clinic at [facility].  I know that we were not able to connect yesterday, I will do my best to reach out before your visit with him on Monday.  I would like to discuss with you coordinating management of cyclosporine and I think just 1 of us should be the prescriber and following up with the levels.  Geographically this would obviously be much easier for him and Armin, but I did not know if  [facility] was able to do same-day cyclosporine levels or not.    Please see below for my notes from this encounter. Please do not hesitate to call me if you have any questions.  As always, I look forward to connecting with you regarding this patient.       Sincerely,     Philip Tovar MD      CC: No Recipients  ______________________________________________________________________________________    Patient ID: Leopoldo Angel is a 79 y.o. male.  Referring Physician: Philip Tovar MD  [address] [place] [address]  Primary Care Provider: No primary care provider on file.    Date of Service:  10/3/2024      Assessment & Plan  Pure red cell aplasia (Multi)  10/3/2024: met with Dr. Angel virtually - reported that rituximab was very well tolerated, but unfortunately he clearly has not had a significant response as he has continued to need intermittent blood transfusions, and even his most recent hemoglobin was 7.8 over 6 weeks from the last dose of rituximab.    I reviewed that despite the failure of rituximab to improve his red cells, we do think we have an appropriate next line of therapy.  Today I discussed with him cyclosporine in detail.  Overall I recommend we dose as per  nonsevere aplastic anemia: Starting at 2.5 mg/kg with a target concentration rate of  (I discussed that I would probably try to keep him closer to 200 if his levels are stable, and may tolerate up to 250).  I reviewed high risk complications of calcineurin inhibitors including infection, PRES, calcineurin inhibitor associated thrombotic microangiopathy; to that end I discussed the critical importance of consistent dosing and appropriate dose monitoring.  I also reviewed many of the other challenges around his medications including hyperplasia increased creatinine, tremor.  I also reviewed that there are 2 forms of cyclosporine, modified unmodified, and that they are not interchangeable and therefore it was important that he sustain the other 1.  To that end I generally recommend we prescribe this to a specialty pharmacy to of a community pharmacy.    I reviewed that Dr. Oconnell and I should try to connect in person, as he and I will need to coordinate which of us will closely follow his cycles warranted.  I do not recommend having to physicians trying to adjust the doses that this is a system-based recipe for error -I noted that [facility] does have the capability for same-day cyclosporine levels which is essential for transplant programs, and I am not certain what [facility]'s capabilities are.  I reviewed that we will do our best to connect and have a definitive plan in place by Monday, and whichever of us will follow the medication will be the one to prescribe it.        Diagnostics:  -Need to review genetic testing with patient   Treatment:  -Recommending we consider starting cyclosporine, 2.5 mg/kg starting dose with a target level of  (if easily controlled we keep closer to 100, consider tolerating up to 250)  Monitoring:   -Continuing CBCs locally - neutrophils improved, hgb has not improved  -Cyclosporine monitoring to be determined  Toxicity management:  -No additional supportive  care    Tentatively consider virtual follow-up in another month, but follow-up to be determined                   Philip Tovar MD      Verbal consent was requested and obtained from patient on this date for a telehealth visit.    History of red cell aplasia:  Presented about 1-2 months after SARS-Cov-2 infection treated with molnuparavir in 12/2023 with severe, newly transfusion dependent anemia.  Retic count was profoundly low. Initial evaluation with normal folate, normal to high B12, high ferritin, high to normal iron,  parvovirus B19 serology positive for IgG but not IgM     Notably, haptoglobin low at < 10, LDH slightly high, but negative DAPHNE and bilirubin normal.      Had a bone marrow biopsy completed 3/18 demonstrating:   BONE MARROW CLOT WITH ASPIRATE AND CORE WITH TOUCH PREP, SITE UNSPECIFIED ([facility], [id number], 3/18/2024):     -- HYPERCELLULAR BONE MARROW (50-60%) WITH MARKED ERYTHROID HYPOPLASIA AND SCATTERED INTERSTITIAL LYMPHOID AGGREGATES, SOME WITH MINUTE GRANULOMAS, SEE NOTE.   NOTE: Per report, flow cytometry detected a small monoclonal lambda-restricted, CD5-negative, [id number] B-cell population (6% of total events. The aggregates seen are predominantly composed of small T lymphocytes. 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 The marked erythroid hypoplasia with left shifted erythropoiesis are suggestive of red cell aplasia. Clinical and radiological correlation is recommended.     Treatment:   I. Prednisone - 1mg/kg  Initiated in April 2024, patient had a near complete response with hemoglobin approaching 12 by the end of 4 to 6 weeks, subsequently tapered prednisone and came completely off in early July 2020.  Patient struggled with fatigue at lower doses of prednisone especially after going under 20 mg, this improved over  time.     Unfortunately, in July 2024 patient's hemoglobin decreased again to less than 10, and he developed a new onset severe neutropenia with an ANC of 0.02.  In the setting of repeat bone marrow biopsy was obtained on 7/18/2024:  BONE MARROW CLOT WITH ASPIRATE AND CORE WITH TOUCH PREP, LEFT ILIAC CREST:       --HYPERCELLULAR BONE MARROW (60%) WITH PURE RED CELL APLASIA AND LOW LEVEL INVOLVEMENT BY LOW-GRADE CD5-, CD10- B CELL LYMPHOPROLIFERATIVE DISORDER, SEE NOTE.  --MARKED POLYCLONAL T CELL LYMPHOCYTOSIS, SEE NOTE     NOTE: The marrow shows a marked paucity of red cell progenitors consistent with a pure red cell aplasia. Additionally, there is a prominent marrow lymphocytosis with a clonal CD5-, CD10- B cell population detected by flow cytometry. Although clonal B cells were detected at roughly 19% of all events by flow cytometry, immunostains reveal only roughly 5% B cells with a marked T cell lymphocytosis noted (roughly 40% of marrow) with vast majority of lymphoid cells T cells. T cells are polyclonal by TRBC1 staining on flow cytometry with no evidence of an abnormal phenotype or increase in gamma/delta T cells. Therefore the T cells appear reactive but markedly increased and may be pathogenically related to the red cell aplasia. Genetic studies are pending to help in the evaluation of the process. Clinical correlation recommended.     Treatment:   II. Rituximab 375mg/m2 IV x4 doses - adminstered through August 2024.   Response: By the end of September there was no significant response to therapy     On 10/3/2024, discussed cyclosporine as a possible option    SUBJECTIVE:     History of Present Illness:  HPI    Reviewed past medical, surgical and family history for updates  Reviewed Social history for updates.    Review of Systems    Reviewed Home Medications & Adherence:     OBJECTIVE:    This was a virtual visit - no vital signs or physical exam could be performed.       Laboratory:  Dr. 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  Recent hemoglobin continues to be below 8, needs trending to have a blood transfusion about every 2 weeks

## 2024-10-04 NOTE — PROGRESS NOTES
Patient ID: Leopoldo Angel is a 79 y.o. male.  Referring Physician: Philip Tovar MD  [address] [place] [address]  Primary Care Provider: No primary care provider on file.    Date of Service:  10/3/2024      Assessment & Plan  Pure red cell aplasia (Multi)  10/3/2024: met with Dr. Angel virtually - reported that rituximab was very well tolerated, but unfortunately he clearly has not had a significant response as he has continued to need intermittent blood transfusions, and even his most recent hemoglobin was 7.8 over 6 weeks from the last dose of rituximab.    I reviewed that despite the failure of rituximab to improve his red cells, we do think we have an appropriate next line of therapy.  Today I discussed with him cyclosporine in detail.  Overall I recommend we dose as per nonsevere aplastic anemia: Starting at 2.5 mg/kg with a target concentration rate of  (I discussed that I would probably try to keep him closer to 200 if his levels are stable, and may tolerate up to 250).  I reviewed high risk complications of calcineurin inhibitors including infection, PRES, calcineurin inhibitor associated thrombotic microangiopathy; to that end I discussed the critical importance of consistent dosing and appropriate dose monitoring.  I also reviewed many of the other challenges around his medications including hyperplasia increased creatinine, tremor.  I also reviewed that there are 2 forms of cyclosporine, modified unmodified, and that they are not interchangeable and therefore it was important that he sustain the other 1.  To that end I generally recommend we prescribe this to a specialty pharmacy to of a community pharmacy.    I reviewed that Dr. Oconnell and I should try to connect in person, as he and I will need to coordinate which of us will closely follow his cycles warranted.  I do not recommend having to physicians trying to adjust the doses that this is a system-based recipe for error -I  noted that [facility] does have the capability for same-day cyclosporine levels which is essential for transplant programs, and I am not certain what [facility]'s capabilities are.  I reviewed that we will do our best to connect and have a definitive plan in place by Monday, and whichever of us will follow the medication will be the one to prescribe it.        Diagnostics:  -Need to review genetic testing with patient   Treatment:  -Recommending we consider starting cyclosporine, 2.5 mg/kg starting dose with a target level of  (if easily controlled we keep closer to 100, consider tolerating up to 250)  Monitoring:   -Continuing CBCs locally - neutrophils improved, hgb has not improved  -Cyclosporine monitoring to be determined  Toxicity management:  -No additional supportive care    Tentatively consider virtual follow-up in another month, but follow-up to be determined                   Philip Tovar MD      Verbal consent was requested and obtained from patient on this date for a telehealth visit.    History of red cell aplasia:  Presented about 1-2 months after SARS-Cov-2 infection treated with molnuparavir in 12/2023 with severe, newly transfusion dependent anemia.  Retic count was profoundly low. Initial evaluation with normal folate, normal to high B12, high ferritin, high to normal iron,  parvovirus B19 serology positive for IgG but not IgM     Notably, haptoglobin low at < 10, LDH slightly high, but negative DAPHNE and bilirubin normal.      Had a bone marrow biopsy completed 3/18 demonstrating:   BONE MARROW CLOT WITH ASPIRATE AND CORE WITH TOUCH PREP, SITE UNSPECIFIED ([facility], [id number], 3/18/2024):     -- HYPERCELLULAR BONE MARROW (50-60%) WITH MARKED ERYTHROID HYPOPLASIA AND SCATTERED INTERSTITIAL LYMPHOID AGGREGATES, SOME WITH MINUTE GRANULOMAS, SEE NOTE.   NOTE: Per report, flow cytometry detected a small monoclonal lambda-restricted, CD5-negative, [id number] B-cell  population (6% of total events. The aggregates seen are predominantly composed of small T lymphocytes. 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 The marked erythroid hypoplasia with left shifted erythropoiesis are suggestive of red cell aplasia. Clinical and radiological correlation is recommended.     Treatment:   I. Prednisone - 1mg/kg  Initiated in April 2024, patient had a near complete response with hemoglobin approaching 12 by the end of 4 to 6 weeks, subsequently tapered prednisone and came completely off in early July 2020.  Patient struggled with fatigue at lower doses of prednisone especially after going under 20 mg, this improved over time.     Unfortunately, in July 2024 patient's hemoglobin decreased again to less than 10, and he developed a new onset severe neutropenia with an ANC of 0.02.  In the setting of repeat bone marrow biopsy was obtained on 7/18/2024:  BONE MARROW CLOT WITH ASPIRATE AND CORE WITH TOUCH PREP, LEFT ILIAC CREST:       --HYPERCELLULAR BONE MARROW (60%) WITH PURE RED CELL APLASIA AND LOW LEVEL INVOLVEMENT BY LOW-GRADE CD5-, CD10- B CELL LYMPHOPROLIFERATIVE DISORDER, SEE NOTE.  --MARKED POLYCLONAL T CELL LYMPHOCYTOSIS, SEE NOTE     NOTE: The marrow shows a marked paucity of red cell progenitors consistent with a pure red cell aplasia. Additionally, there is a prominent marrow lymphocytosis with a clonal CD5-, CD10- B cell population detected by flow cytometry. Although clonal B cells were detected at roughly 19% of all events by flow cytometry, immunostains reveal only roughly 5% B cells with a marked T cell lymphocytosis noted (roughly 40% of marrow) with vast majority of lymphoid cells T cells. T cells are polyclonal by TRBC1 staining on flow cytometry with no evidence of an abnormal phenotype or increase in gamma/delta T cells.  Therefore the T cells appear reactive but markedly increased and may be pathogenically related to the red cell aplasia. Genetic studies are pending to help in the evaluation of the process. Clinical correlation recommended.     Treatment:   II. Rituximab 375mg/m2 IV x4 doses - adminstered through August 2024.   Response: By the end of September there was no significant response to therapy     On 10/3/2024, discussed cyclosporine as a possible option    SUBJECTIVE:     History of Present Illness:  HPI    Reviewed past medical, surgical and family history for updates  Reviewed Social history for updates.    Review of Systems    Reviewed Home Medications & Adherence:     OBJECTIVE:    This was a virtual visit - no vital signs or physical exam could be performed.       Laboratory:  Dr. 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  Recent hemoglobin continues to be below 8, needs trending to have a blood transfusion about every 2 weeks

## 2024-10-04 NOTE — ASSESSMENT & PLAN NOTE
10/3/2024: met with Dr. Angel virtually - reported that rituximab was very well tolerated, but unfortunately he clearly has not had a significant response as he has continued to need intermittent blood transfusions, and even his most recent hemoglobin was 7.8 over 6 weeks from the last dose of rituximab.    I reviewed that despite the failure of rituximab to improve his red cells, we do think we have an appropriate next line of therapy.  Today I discussed with him cyclosporine in detail.  Overall I recommend we dose as per nonsevere aplastic anemia: Starting at 2.5 mg/kg with a target concentration rate of  (I discussed that I would probably try to keep him closer to 200 if his levels are stable, and may tolerate up to 250).  I reviewed high risk complications of calcineurin inhibitors including infection, PRES, calcineurin inhibitor associated thrombotic microangiopathy; to that end I discussed the critical importance of consistent dosing and appropriate dose monitoring.  I also reviewed many of the other challenges around his medications including hyperplasia increased creatinine, tremor.  I also reviewed that there are 2 forms of cyclosporine, modified unmodified, and that they are not interchangeable and therefore it was important that he sustain the other 1.  To that end I generally recommend we prescribe this to a specialty pharmacy to of a community pharmacy.    I reviewed that Dr. Oconnell and I should try to connect in person, as he and I will need to coordinate which of us will closely follow his cycles warranted.  I do not recommend having to physicians trying to adjust the doses that this is a system-based recipe for error -I noted that [facility] does have the capability for same-day cyclosporine levels which is essential for transplant programs, and I am not certain what [facility] capabilities are.  I reviewed that we will do our best to connect and have a definitive plan in place by  Monday, and whichever of us will follow the medication will be the one to prescribe it.        Diagnostics:  -Need to review genetic testing with patient   Treatment:  -Recommending we consider starting cyclosporine, 2.5 mg/kg starting dose with a target level of  (if easily controlled we keep closer to 100, consider tolerating up to 250)  Monitoring:   -Continuing CBCs locally - neutrophils improved, hgb has not improved  -Cyclosporine monitoring to be determined  Toxicity management:  -No additional supportive care    Tentatively consider virtual follow-up in another month, but follow-up to be determined

## 2024-10-07 ENCOUNTER — TELEPHONE (OUTPATIENT)
Dept: HEMATOLOGY/ONCOLOGY | Facility: HOSPITAL | Age: 79
End: 2024-10-07
Payer: COMMERCIAL

## 2024-10-07 DIAGNOSIS — D61.01 PURE RED CELL APLASIA (MULTI): Primary | ICD-10-CM

## 2024-10-07 RX ORDER — CYCLOSPORINE 25 MG/1
25 CAPSULE, LIQUID FILLED ORAL 2 TIMES DAILY
Qty: 60 CAPSULE | Refills: 11 | Status: SHIPPED | OUTPATIENT
Start: 2024-10-07 | End: 2024-10-08

## 2024-10-07 RX ORDER — CYCLOSPORINE 50 MG/1
50 CAPSULE, LIQUID FILLED ORAL 2 TIMES DAILY
Qty: 60 CAPSULE | Refills: 11 | Status: SHIPPED | OUTPATIENT
Start: 2024-10-07 | End: 2024-10-08 | Stop reason: SDUPTHER

## 2024-10-07 RX ORDER — CYCLOSPORINE 25 MG/1
25 CAPSULE, LIQUID FILLED ORAL 2 TIMES DAILY
Qty: 60 CAPSULE | Refills: 11 | Status: SHIPPED | OUTPATIENT
Start: 2024-10-07 | End: 2024-10-07 | Stop reason: SDUPTHER

## 2024-10-07 RX ORDER — CYCLOSPORINE 50 MG/1
50 CAPSULE, LIQUID FILLED ORAL 2 TIMES DAILY
Qty: 60 CAPSULE | Refills: 11 | Status: SHIPPED | OUTPATIENT
Start: 2024-10-07 | End: 2024-10-07 | Stop reason: SDUPTHER

## 2024-10-07 RX ORDER — FLUCONAZOLE 200 MG/1
200 TABLET ORAL DAILY
Qty: 90 TABLET | Refills: 1 | Status: SHIPPED | OUTPATIENT
Start: 2024-10-07 | End: 2025-04-05

## 2024-10-07 RX ORDER — ASPIRIN 81 MG/1
81 TABLET ORAL DAILY
Status: CANCELLED | OUTPATIENT
Start: 2024-10-07

## 2024-10-07 RX ORDER — ACYCLOVIR 400 MG/1
400 TABLET ORAL 2 TIMES DAILY
Qty: 180 TABLET | Refills: 1 | Status: SHIPPED | OUTPATIENT
Start: 2024-10-07 | End: 2025-04-05

## 2024-10-07 RX ORDER — SULFAMETHOXAZOLE AND TRIMETHOPRIM 800; 160 MG/1; MG/1
1 TABLET ORAL
Qty: 12 TABLET | Refills: 5 | Status: SHIPPED | OUTPATIENT
Start: 2024-10-07 | End: 2025-04-05

## 2024-10-07 NOTE — TELEPHONE ENCOUNTER
Patient ID: Leopoldo Angel is a 79 y.o. male.  Referring Physician: No referring provider defined for this encounter.  Primary Care Provider: No primary care provider on file.    Date of Service:  10/7/2024      Assessment & Plan  Pure red cell aplasia (Multi)  10/7/2024: I connected with Dr. Angel again today after he had seen Dr. Webb today at [place].  I discussed my plan to initiate cyclosporine with him.  I recommended we start prophylactic antimicrobials including acyclovir, fluconazole, trimethoprim/sulfamethoxazole (3 times weekly) and confirmed that he did not have previous reactions or allergies to these medications before.    I reviewed the importance of ensuring he is taking the fluconazole consistently, as cyclosporine levels will somewhat depend on this.  I reviewed that we would plan on a first level 72 hours after starting her doses.  In combination with fluconazole, I will start him at 1 mg/kg (75 mg) twice daily.    I reviewed timing of cyclosporine draws; on days when he is getting laboratories drawn he should hold his morning dose of cyclosporine in order to ensure that his medicine level will be measured at a trough.      I discussed that I would send this initially to [facility] specialty pharmacy; having our pharmacy techs be able to review the medication and ensure proper prior authorization is often advantageous; if his insurance requires this to be routed to a different specialty pharmacy, they will typically help me direct which is the most appropriate for his insurance.        Diagnostics:  -Need to review genetic testing with patient   Treatment:  -Recommending we consider starting cyclosporine, 2.5 mg/kg (actual start willb e 1mg/kg q12hs in combination with fluconazole).  starting dose with a target level of  (if easily controlled we keep closer to 100, consider tolerating up to 250)  Monitoring:   -Continuing CBCs locally - neutrophils improved, hgb has not  improved  -Cyclosporine monitoring 2x weekly initially - will plan on Mondays and Thursdays through .    Toxicity management:  -No additional supportive care    Tentatively consider virtual follow-up in another month, but follow-up to be determined

## 2024-10-07 NOTE — ASSESSMENT & PLAN NOTE
10/7/2024: I connected with Dr. Angel again today after he had seen Dr. Webb today at [place].  I discussed my plan to initiate cyclosporine with him.  I recommended we start prophylactic antimicrobials including acyclovir, fluconazole, trimethoprim/sulfamethoxazole (3 times weekly) and confirmed that he did not have previous reactions or allergies to these medications before.    I reviewed the importance of ensuring he is taking the fluconazole consistently, as cyclosporine levels will somewhat depend on this.  I reviewed that we would plan on a first level 72 hours after starting her doses.  In combination with fluconazole, I will start him at 1 mg/kg (75 mg) twice daily.    I reviewed timing of cyclosporine draws; on days when he is getting laboratories drawn he should hold his morning dose of cyclosporine in order to ensure that his medicine level will be measured at a trough.      I discussed that I would send this initially to [facility] specialty pharmacy; having our pharmacy techs be able to review the medication and ensure proper prior authorization is often advantageous; if his insurance requires this to be routed to a different specialty pharmacy, they will typically help me direct which is the most appropriate for his insurance.        Diagnostics:  -Need to review genetic testing with patient   Treatment:  -Recommending we consider starting cyclosporine, 2.5 mg/kg (actual start willb e 1mg/kg q12hs in combination with fluconazole).  starting dose with a target level of  (if easily controlled we keep closer to 100, consider tolerating up to 250)  Monitoring:   -Continuing CBCs locally - neutrophils improved, hgb has not improved  -Cyclosporine monitoring 2x weekly initially - will plan on Mondays and Thursdays through .    Toxicity management:  -No additional supportive care    Tentatively consider virtual follow-up in another month, but follow-up to be determined

## 2024-10-08 ENCOUNTER — ONCOLOGY MEDICATION OUTREACH (OUTPATIENT)
Dept: HEMATOLOGY/ONCOLOGY | Facility: HOSPITAL | Age: 79
End: 2024-10-08
Payer: COMMERCIAL

## 2024-10-08 ENCOUNTER — SPECIALTY PHARMACY (OUTPATIENT)
Dept: PHARMACY | Facility: CLINIC | Age: 79
End: 2024-10-08

## 2024-10-08 DIAGNOSIS — D61.01 PURE RED CELL APLASIA (MULTI): ICD-10-CM

## 2024-10-08 PROCEDURE — RXMED WILLOW AMBULATORY MEDICATION CHARGE

## 2024-10-08 RX ORDER — CYCLOSPORINE 25 MG/1
75 CAPSULE, LIQUID FILLED ORAL 2 TIMES DAILY
Qty: 180 CAPSULE | Refills: 11 | Status: SHIPPED | OUTPATIENT
Start: 2024-10-08 | End: 2025-10-08

## 2024-10-08 NOTE — PROGRESS NOTES
Spoke to patient over phone to review CSA dosing and laboratory monitoring plan for PRBCA. Patient is to start 1 mg/kg q12h CSA (reduced dose dt fluconazole), and will obtain twice weekly labs on Monday and Thursday. Starting dose is equivalent to three 25 mg capsules. Initial target concentration is 100-200. Informed him to get blood draw 1-2 hours before morning dose to ensure accurate trough concentrations. To simplify the first few days, I instructed him to start CSA on a Wednesday and get first lab draw the following Monday. Stressed importance of being consistent with fluconazole, but we will ultimately use trough levels to drive dosing. Reviewed need to monitor for nephrotoxicity and electrolyte abnormalities. He will let us know if he develops a rash (never exposed to sulfa or taken bactrim before). Reviewed [facility] process and expected delivery of medication within ~1 week. All questions answered.

## 2024-10-09 ENCOUNTER — PHARMACY VISIT (OUTPATIENT)
Dept: PHARMACY | Facility: CLINIC | Age: 79
End: 2024-10-09
Payer: COMMERCIAL

## 2024-10-09 ENCOUNTER — TELEPHONE (OUTPATIENT)
Dept: HEMATOLOGY/ONCOLOGY | Facility: HOSPITAL | Age: 79
End: 2024-10-09
Payer: COMMERCIAL

## 2024-10-09 NOTE — TELEPHONE ENCOUNTER
Patient called in with follow up questions on CSA start for PRBCA. Updated me that CSA will be delivered tomorrow 10/9. Is to start cyclosporine, acyclovir, and fluconazole tomorrow. Bactrim to start Friday since it's MWF. Will obtain labs for the first time next Monday. Reviewed twice a week monitoring plan and concept of trough concentrations. All questions answered.

## 2024-10-11 ENCOUNTER — TELEPHONE (OUTPATIENT)
Dept: HEMATOLOGY/ONCOLOGY | Facility: HOSPITAL | Age: 79
End: 2024-10-11
Payer: COMMERCIAL

## 2024-10-11 NOTE — TELEPHONE ENCOUNTER
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     His hgb was 6.7.    He is getting the transfusion at [facility].      He sent you an e-mail also but he started his cyclosporin last night with no reaction at this time.      This is just an RdioI    Message sent

## 2024-10-14 ENCOUNTER — TELEPHONE (OUTPATIENT)
Dept: HEMATOLOGY/ONCOLOGY | Facility: CLINIC | Age: 79
End: 2024-10-14

## 2024-10-14 ENCOUNTER — TELEPHONE (OUTPATIENT)
Dept: ADMISSION | Facility: HOSPITAL | Age: 79
End: 2024-10-14
Payer: COMMERCIAL

## 2024-10-14 ENCOUNTER — LAB (OUTPATIENT)
Dept: LAB | Facility: HOSPITAL | Age: 79
End: 2024-10-14
Payer: COMMERCIAL

## 2024-10-14 DIAGNOSIS — D61.01 PURE RED CELL APLASIA (MULTI): ICD-10-CM

## 2024-10-14 LAB
ALBUMIN SERPL BCP-MCNC: 4.7 G/DL (ref 3.4–5)
ALP SERPL-CCNC: 92 U/L (ref 33–136)
ALT SERPL W P-5'-P-CCNC: 46 U/L (ref 10–52)
ANION GAP SERPL CALC-SCNC: 10 MMOL/L (ref 10–20)
AST SERPL W P-5'-P-CCNC: 30 U/L (ref 9–39)
BASOPHILS # BLD AUTO: 0.01 X10*3/UL (ref 0–0.1)
BASOPHILS NFR BLD AUTO: 0.2 %
BILIRUB SERPL-MCNC: 0.8 MG/DL (ref 0–1.2)
BUN SERPL-MCNC: 28 MG/DL (ref 6–23)
CALCIUM SERPL-MCNC: 9.9 MG/DL (ref 8.6–10.3)
CHLORIDE SERPL-SCNC: 104 MMOL/L (ref 98–107)
CO2 SERPL-SCNC: 27 MMOL/L (ref 21–32)
CREAT SERPL-MCNC: 1.25 MG/DL (ref 0.5–1.3)
EGFRCR SERPLBLD CKD-EPI 2021: 59 ML/MIN/1.73M*2
EOSINOPHIL # BLD AUTO: 0.05 X10*3/UL (ref 0–0.4)
EOSINOPHIL NFR BLD AUTO: 1.2 %
ERYTHROCYTE [DISTWIDTH] IN BLOOD BY AUTOMATED COUNT: 13.2 % (ref 11.5–14.5)
GLUCOSE SERPL-MCNC: 108 MG/DL (ref 74–99)
HCT VFR BLD AUTO: 29.6 % (ref 41–52)
HGB BLD-MCNC: 10.1 G/DL (ref 13.5–17.5)
IMM GRANULOCYTES # BLD AUTO: 0.05 X10*3/UL (ref 0–0.5)
IMM GRANULOCYTES NFR BLD AUTO: 1.2 % (ref 0–0.9)
LYMPHOCYTES # BLD AUTO: 0.95 X10*3/UL (ref 0.8–3)
LYMPHOCYTES NFR BLD AUTO: 22.5 %
MAGNESIUM SERPL-MCNC: 1.85 MG/DL (ref 1.6–2.4)
MCH RBC QN AUTO: 30 PG (ref 26–34)
MCHC RBC AUTO-ENTMCNC: 34.1 G/DL (ref 32–36)
MCV RBC AUTO: 88 FL (ref 80–100)
MONOCYTES # BLD AUTO: 0.72 X10*3/UL (ref 0.05–0.8)
MONOCYTES NFR BLD AUTO: 17.1 %
NEUTROPHILS # BLD AUTO: 2.44 X10*3/UL (ref 1.6–5.5)
NEUTROPHILS NFR BLD AUTO: 57.8 %
PLATELET # BLD AUTO: 200 X10*3/UL (ref 150–450)
POTASSIUM SERPL-SCNC: 4 MMOL/L (ref 3.5–5.3)
PROT SERPL-MCNC: 6.5 G/DL (ref 6.4–8.2)
RBC # BLD AUTO: 3.37 X10*6/UL (ref 4.5–5.9)
SODIUM SERPL-SCNC: 137 MMOL/L (ref 136–145)
WBC # BLD AUTO: 4.2 X10*3/UL (ref 4.4–11.3)

## 2024-10-14 PROCEDURE — 36415 COLL VENOUS BLD VENIPUNCTURE: CPT

## 2024-10-14 PROCEDURE — 83735 ASSAY OF MAGNESIUM: CPT

## 2024-10-14 PROCEDURE — 80053 COMPREHEN METABOLIC PANEL: CPT

## 2024-10-14 PROCEDURE — 80158 DRUG ASSAY CYCLOSPORINE: CPT | Mod: PORLAB

## 2024-10-14 PROCEDURE — 85025 COMPLETE CBC W/AUTO DIFF WBC: CPT

## 2024-10-14 NOTE — TELEPHONE ENCOUNTER
Pt left VM message on triage line- advised he had labs completed this morning and would like to discuss medications with JESUS Valdez [facility]>

## 2024-10-15 LAB — CYCLOSPORINE BLD IA-MCNC: 36 NG/ML (ref 80–210)

## 2024-10-17 ENCOUNTER — LAB (OUTPATIENT)
Dept: LAB | Facility: HOSPITAL | Age: 79
End: 2024-10-17
Payer: COMMERCIAL

## 2024-10-17 DIAGNOSIS — D61.01 PURE RED CELL APLASIA (MULTI): ICD-10-CM

## 2024-10-17 LAB
ALBUMIN SERPL BCP-MCNC: 4.7 G/DL (ref 3.4–5)
ALP SERPL-CCNC: 88 U/L (ref 33–136)
ALT SERPL W P-5'-P-CCNC: 46 U/L (ref 10–52)
ANION GAP SERPL CALC-SCNC: 11 MMOL/L (ref 10–20)
AST SERPL W P-5'-P-CCNC: 48 U/L (ref 9–39)
BASOPHILS # BLD AUTO: 0.01 X10*3/UL (ref 0–0.1)
BASOPHILS NFR BLD AUTO: 0.2 %
BILIRUB SERPL-MCNC: 1 MG/DL (ref 0–1.2)
BUN SERPL-MCNC: 25 MG/DL (ref 6–23)
CALCIUM SERPL-MCNC: 9.8 MG/DL (ref 8.6–10.3)
CHLORIDE SERPL-SCNC: 104 MMOL/L (ref 98–107)
CO2 SERPL-SCNC: 24 MMOL/L (ref 21–32)
CREAT SERPL-MCNC: 1.27 MG/DL (ref 0.5–1.3)
EGFRCR SERPLBLD CKD-EPI 2021: 58 ML/MIN/1.73M*2
EOSINOPHIL # BLD AUTO: 0.03 X10*3/UL (ref 0–0.4)
EOSINOPHIL NFR BLD AUTO: 0.7 %
ERYTHROCYTE [DISTWIDTH] IN BLOOD BY AUTOMATED COUNT: 13.6 % (ref 11.5–14.5)
GLUCOSE SERPL-MCNC: 121 MG/DL (ref 74–99)
HCT VFR BLD AUTO: 27.7 % (ref 41–52)
HGB BLD-MCNC: 9.5 G/DL (ref 13.5–17.5)
IMM GRANULOCYTES # BLD AUTO: 0.03 X10*3/UL (ref 0–0.5)
IMM GRANULOCYTES NFR BLD AUTO: 0.7 % (ref 0–0.9)
LYMPHOCYTES # BLD AUTO: 1.21 X10*3/UL (ref 0.8–3)
LYMPHOCYTES NFR BLD AUTO: 26.9 %
MAGNESIUM SERPL-MCNC: 1.85 MG/DL (ref 1.6–2.4)
MCH RBC QN AUTO: 30.2 PG (ref 26–34)
MCHC RBC AUTO-ENTMCNC: 34.3 G/DL (ref 32–36)
MCV RBC AUTO: 88 FL (ref 80–100)
MONOCYTES # BLD AUTO: 1.06 X10*3/UL (ref 0.05–0.8)
MONOCYTES NFR BLD AUTO: 23.6 %
NEUTROPHILS # BLD AUTO: 2.15 X10*3/UL (ref 1.6–5.5)
NEUTROPHILS NFR BLD AUTO: 47.9 %
PLATELET # BLD AUTO: 218 X10*3/UL (ref 150–450)
POTASSIUM SERPL-SCNC: 4.4 MMOL/L (ref 3.5–5.3)
PROT SERPL-MCNC: 6.7 G/DL (ref 6.4–8.2)
RBC # BLD AUTO: 3.15 X10*6/UL (ref 4.5–5.9)
SODIUM SERPL-SCNC: 135 MMOL/L (ref 136–145)
WBC # BLD AUTO: 4.5 X10*3/UL (ref 4.4–11.3)

## 2024-10-17 PROCEDURE — 85025 COMPLETE CBC W/AUTO DIFF WBC: CPT

## 2024-10-17 PROCEDURE — 84075 ASSAY ALKALINE PHOSPHATASE: CPT

## 2024-10-17 PROCEDURE — 80158 DRUG ASSAY CYCLOSPORINE: CPT | Mod: PORLAB

## 2024-10-17 PROCEDURE — 36415 COLL VENOUS BLD VENIPUNCTURE: CPT

## 2024-10-17 PROCEDURE — 83735 ASSAY OF MAGNESIUM: CPT

## 2024-10-18 ENCOUNTER — TELEPHONE (OUTPATIENT)
Dept: HEMATOLOGY/ONCOLOGY | Facility: HOSPITAL | Age: 79
End: 2024-10-18
Payer: COMMERCIAL

## 2024-10-18 LAB — CYCLOSPORINE BLD IA-MCNC: 53 NG/ML (ref 80–210)

## 2024-10-18 RX ORDER — CYCLOSPORINE 100 MG/1
100 CAPSULE, LIQUID FILLED ORAL 2 TIMES DAILY
Qty: 60 CAPSULE | Refills: 11 | Status: SHIPPED | OUTPATIENT
Start: 2024-10-18 | End: 2025-10-18

## 2024-10-21 ENCOUNTER — LAB (OUTPATIENT)
Dept: LAB | Facility: HOSPITAL | Age: 79
End: 2024-10-21
Payer: COMMERCIAL

## 2024-10-21 DIAGNOSIS — D61.01 PURE RED CELL APLASIA (MULTI): ICD-10-CM

## 2024-10-21 LAB
ALBUMIN SERPL BCP-MCNC: 4.7 G/DL (ref 3.4–5)
ALP SERPL-CCNC: 77 U/L (ref 33–136)
ALT SERPL W P-5'-P-CCNC: 64 U/L (ref 10–52)
ANION GAP SERPL CALC-SCNC: 12 MMOL/L (ref 10–20)
AST SERPL W P-5'-P-CCNC: 55 U/L (ref 9–39)
BASOPHILS # BLD AUTO: 0.01 X10*3/UL (ref 0–0.1)
BASOPHILS NFR BLD AUTO: 0.2 %
BILIRUB SERPL-MCNC: 0.9 MG/DL (ref 0–1.2)
BUN SERPL-MCNC: 27 MG/DL (ref 6–23)
CALCIUM SERPL-MCNC: 9.7 MG/DL (ref 8.6–10.3)
CHLORIDE SERPL-SCNC: 104 MMOL/L (ref 98–107)
CO2 SERPL-SCNC: 25 MMOL/L (ref 21–32)
CREAT SERPL-MCNC: 1.23 MG/DL (ref 0.5–1.3)
EGFRCR SERPLBLD CKD-EPI 2021: 60 ML/MIN/1.73M*2
EOSINOPHIL # BLD AUTO: 0.06 X10*3/UL (ref 0–0.4)
EOSINOPHIL NFR BLD AUTO: 1.4 %
ERYTHROCYTE [DISTWIDTH] IN BLOOD BY AUTOMATED COUNT: 17.4 % (ref 11.5–14.5)
GLUCOSE SERPL-MCNC: 123 MG/DL (ref 74–99)
HCT VFR BLD AUTO: 31.9 % (ref 41–52)
HGB BLD-MCNC: 10.2 G/DL (ref 13.5–17.5)
IMM GRANULOCYTES # BLD AUTO: 0.01 X10*3/UL (ref 0–0.5)
IMM GRANULOCYTES NFR BLD AUTO: 0.2 % (ref 0–0.9)
LYMPHOCYTES # BLD AUTO: 1.48 X10*3/UL (ref 0.8–3)
LYMPHOCYTES NFR BLD AUTO: 34.7 %
MAGNESIUM SERPL-MCNC: 1.88 MG/DL (ref 1.6–2.4)
MCH RBC QN AUTO: 30.3 PG (ref 26–34)
MCHC RBC AUTO-ENTMCNC: 32 G/DL (ref 32–36)
MCV RBC AUTO: 95 FL (ref 80–100)
MONOCYTES # BLD AUTO: 0.69 X10*3/UL (ref 0.05–0.8)
MONOCYTES NFR BLD AUTO: 16.2 %
NEUTROPHILS # BLD AUTO: 2.02 X10*3/UL (ref 1.6–5.5)
NEUTROPHILS NFR BLD AUTO: 47.3 %
PLATELET # BLD AUTO: 114 X10*3/UL (ref 150–450)
POTASSIUM SERPL-SCNC: 4.6 MMOL/L (ref 3.5–5.3)
PROT SERPL-MCNC: 6.6 G/DL (ref 6.4–8.2)
RBC # BLD AUTO: 3.37 X10*6/UL (ref 4.5–5.9)
SODIUM SERPL-SCNC: 136 MMOL/L (ref 136–145)
WBC # BLD AUTO: 4.3 X10*3/UL (ref 4.4–11.3)

## 2024-10-21 PROCEDURE — 36415 COLL VENOUS BLD VENIPUNCTURE: CPT

## 2024-10-21 PROCEDURE — 85025 COMPLETE CBC W/AUTO DIFF WBC: CPT

## 2024-10-21 PROCEDURE — 80053 COMPREHEN METABOLIC PANEL: CPT

## 2024-10-21 PROCEDURE — 83735 ASSAY OF MAGNESIUM: CPT

## 2024-10-21 PROCEDURE — 80158 DRUG ASSAY CYCLOSPORINE: CPT | Mod: PORLAB

## 2024-10-21 NOTE — TELEPHONE ENCOUNTER
I was able to contact patient Friday evening, I reviewed with him the updated cyclosporine level of 53.  I discussed increasing his cyclosporine dose to 100 mg twice a day.  He will use his current 25 mg tablets for this, but I told him I would send in the 100 mg tabs so that we have this is a baseline for future adjustments, and I added this to his levels.    I also discussed that he does appear to be increasing slowly, based off of Monday as result, we can be flexible with later laboratory draws, I discussed my concerns with using a send out lab as typically weekend days are often not included in the potential days counted until results are obtained.  Nevertheless, my hope is we can be little bit more flexible with him, he noted that his spouse has several appointments with orthopedics coming up it could prove challenging for follow-up.

## 2024-10-22 LAB — CYCLOSPORINE BLD IA-MCNC: 102 NG/ML (ref 80–210)

## 2024-10-25 ENCOUNTER — TELEPHONE (OUTPATIENT)
Dept: ADMISSION | Facility: HOSPITAL | Age: 79
End: 2024-10-25
Payer: COMMERCIAL

## 2024-10-25 NOTE — TELEPHONE ENCOUNTER
Dr. Angel wanted to let Dr. Tovar that his Cyclosporine level is 185. He did send a [place] message as well.   He is now taking 100mg BID.   Asking if any changes need to be made to his dose.

## 2024-11-01 ENCOUNTER — TELEPHONE (OUTPATIENT)
Dept: HEMATOLOGY/ONCOLOGY | Facility: HOSPITAL | Age: 79
End: 2024-11-01
Payer: COMMERCIAL

## 2024-11-05 ENCOUNTER — TELEPHONE (OUTPATIENT)
Dept: HEMATOLOGY/ONCOLOGY | Facility: HOSPITAL | Age: 79
End: 2024-11-05
Payer: COMMERCIAL

## 2024-11-05 NOTE — TELEPHONE ENCOUNTER
Dr. Angel sent the following message via [employer] then called to follow up:    Dr. Tovar,  My cyclosporine level from yesterday is 134 ng/mL.  Do I need to make changes?  I am attaching a print-out of the past 4 results.     Francesca Rothman   [facility]. 11-4-2024. Results - [facility]-ATTN - CYCLOSPORINE. [email or web address].pdf    He states that his prior level was 164 and he has another blood test scheduled for this Thursday. He would like to know if he needs tp adjust his medication.    Message sent to team.

## 2024-11-08 ENCOUNTER — TELEPHONE (OUTPATIENT)
Dept: ADMISSION | Facility: HOSPITAL | Age: 79
End: 2024-11-08
Payer: COMMERCIAL

## 2024-11-08 NOTE — TELEPHONE ENCOUNTER
Per Yojana Stein CNP, pt's level  af 126 is right where Dr. Tovar wants it.  Goal is between .  Pt should maintain same cyclosporine dose for now.  Pt advised.  No further needs at this time.

## 2024-11-08 NOTE — TELEPHONE ENCOUNTER
Pt left a voicemail on the RN triage line following up on cyclosporine levels from [place] that he sent via [employer] message earlier today.  He asked if dose needed to be adjusted (currently taking 100mg BID).

## 2024-11-11 ENCOUNTER — TELEMEDICINE (OUTPATIENT)
Dept: HEMATOLOGY/ONCOLOGY | Facility: HOSPITAL | Age: 79
End: 2024-11-11
Payer: COMMERCIAL

## 2024-11-11 DIAGNOSIS — D61.01 PURE RED CELL APLASIA (MULTI): Primary | ICD-10-CM

## 2024-11-11 PROCEDURE — 99213 OFFICE O/P EST LOW 20 MIN: CPT | Performed by: INTERNAL MEDICINE

## 2024-11-11 NOTE — LETTER
2024      TO: MD Armand Matta MD  [address] [place] [address] Hematology And Oncology  [place] [phone]       Regarding:   Patient:  :  Visit Date: Leopoldo Angel  1945  2024       Dear Dr. Armand Oconnell MD     I saw our mutual patient Leopoldo Angel for an interval visit in the malignant hematology clinic at [facility].  Please see below for my notes from this encounter. Please do not hesitate to call me if you have any questions. I look forward to continuing to follow your patient along with you.      Sincerely,    Philip Tovar MD         CC: Armand Oconnell MD  [address] [place] [address] Hematology And Oncology  [place] [phone]  Via Fax: [phone]    Armand Oconnell MD  [address] [place] [address] Hematology And Oncology  [place] [phone]       Please see my documentation below:   Patient ID: Leopoldo Angel is a 79 y.o. male.  Referring Physician: No referring provider defined for this encounter.  Primary Care Provider: No primary care provider on file.    Date of Service:  2024      Assessment & Plan  Pure red cell aplasia (Multi)  2024: Today, I connected virtually with Dr. Angel.  We discussed his current cyclosporine levels - at 100mg twice daily, he has been running 120-130s and stable enough that I think we can move to weekly levels thorugh the end of November with considerations of further increases in lab draws from there.    He endorses feeling quite well, has reastarted routine exercise, feels his energy has come back, all consistent with an encouraging response.     We reviewed cyclosporine toxicity to monitor for, including hypertension, renal issues, gum hyperplasia.  We reviewed the mild AST/ALT elevations and that we will need to continue to monitor these going forward.       I reviewed that we will likely try to maintain a dose and repsonse for 4-6 months and depend on blood counts we might consider a slow  taper in the future.  We reviewed and confirmed he is taking his antimicrobial prophylaxis.     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   Monitoring:   -Continuing CBCs, CMP, and cyclosporine locall - move to weekly testing  Toxicity management:  -No additional supportive care    virtual follow-up in another month, but follow-up to be determined                   Philip Tovar MD    Virtual or Telephone Consent  An interactive audio and video telecommunication system which permits real time communications between the patient (at the originating site) and provider (at the distant site) was utilized to provide this telehealth service.   Verbal consent was requested and obtained from Leopoldo Angel on this date, 11/12/24 for a telehealth visit.     Oncology History    No history exists.        SUBJECTIVE:     History of Present Illness:  He is feeling well - exercising again, no bleeding, no bruising - not finding major issues with taking his cyclosporine - very happy to see his blood counts improving nicely.          Reviewed past medical, surgical and family history for updates  Reviewed Social history for updates.    Review of Systems    Reviewed Home Medications & Adherence:     OBJECTIVE:    This was a virtual visit - no vital signs or physical exam could be performed.       Laboratory:  External laboratory studies from [place] were reviewed, including most recent cyclosporine level of 126  AST/ALT both elevated within 2xULN

## 2024-11-12 NOTE — PROGRESS NOTES
Patient ID: Leopoldo Angel is a 79 y.o. male.  Referring Physician: No referring provider defined for this encounter.  Primary Care Provider: No primary care provider on file.    Date of Service:  11/11/2024      Assessment & Plan  Pure red cell aplasia (Multi)  11/11/2024: Today, I connected virtually with Dr. Angel.  We discussed his current cyclosporine levels - at 100mg twice daily, he has been running 120-130s and stable enough that I think we can move to weekly levels thorugh the end of November with considerations of further increases in lab draws from there.    He endorses feeling quite well, has reastarted routine exercise, feels his energy has come back, all consistent with an encouraging response.     We reviewed cyclosporine toxicity to monitor for, including hypertension, renal issues, gum hyperplasia.  We reviewed the mild AST/ALT elevations and that we will need to continue to monitor these going forward.       I reviewed that we will likely try to maintain a dose and repsonse for 4-6 months and depend on blood counts we might consider a slow taper in the future.  We reviewed and confirmed he is taking his antimicrobial prophylaxis.     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   Monitoring:   -Continuing CBCs, CMP, and cyclosporine locall - move to weekly testing  Toxicity management:  -No additional supportive care    virtual follow-up in another month, but follow-up to be determined                   Philip Tovar MD    Virtual or Telephone Consent  An interactive audio and video telecommunication system which permits real time communications between the patient (at the originating site) and provider (at the distant site) was utilized to provide this telehealth service.   Verbal consent was requested and obtained from  Leopoldo Angel on this date, 11/12/24 for a telehealth visit.     Oncology History    No history exists.        SUBJECTIVE:     History of Present Illness:  He is feeling well - exercising again, no bleeding, no bruising - not finding major issues with taking his cyclosporine - very happy to see his blood counts improving nicely.          Reviewed past medical, surgical and family history for updates  Reviewed Social history for updates.    Review of Systems    Reviewed Home Medications & Adherence:     OBJECTIVE:    This was a virtual visit - no vital signs or physical exam could be performed.       Laboratory:  External laboratory studies from [place] were reviewed, including most recent cyclosporine level of 126  AST/ALT both elevated within 2xULN

## 2024-11-12 NOTE — ASSESSMENT & PLAN NOTE
11/11/2024: Today, I connected virtually with Dr. Angel.  We discussed his current cyclosporine levels - at 100mg twice daily, he has been running 120-130s and stable enough that I think we can move to weekly levels thorugh the end of November with considerations of further increases in lab draws from there.    He endorses feeling quite well, has reastarted routine exercise, feels his energy has come back, all consistent with an encouraging response.     We reviewed cyclosporine toxicity to monitor for, including hypertension, renal issues, gum hyperplasia.  We reviewed the mild AST/ALT elevations and that we will need to continue to monitor these going forward.       I reviewed that we will likely try to maintain a dose and repsonse for 4-6 months and depend on blood counts we might consider a slow taper in the future.  We reviewed and confirmed he is taking his antimicrobial prophylaxis.     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   Monitoring:   -Continuing CBCs, CMP, and cyclosporine locall - move to weekly testing  Toxicity management:  -No additional supportive care    virtual follow-up in another month, but follow-up to be determined

## 2024-11-27 DIAGNOSIS — I10 ESSENTIAL HYPERTENSION: Primary | ICD-10-CM

## 2024-11-27 RX ORDER — AMLODIPINE BESYLATE 5 MG/1
5 TABLET ORAL DAILY
Qty: 30 TABLET | Refills: 11 | Status: SHIPPED | OUTPATIENT
Start: 2024-11-27 | End: 2025-11-27

## 2025-01-02 ENCOUNTER — TELEMEDICINE (OUTPATIENT)
Dept: HEMATOLOGY/ONCOLOGY | Facility: CLINIC | Age: 80
End: 2025-01-02
Payer: COMMERCIAL

## 2025-01-02 DIAGNOSIS — I10 PRIMARY HYPERTENSION: ICD-10-CM

## 2025-01-02 DIAGNOSIS — D61.01 PURE RED CELL APLASIA (MULTI): Primary | ICD-10-CM

## 2025-01-02 DIAGNOSIS — R74.01 ELEVATED LIVER TRANSAMINASE LEVEL: ICD-10-CM

## 2025-01-02 PROCEDURE — 99213 OFFICE O/P EST LOW 20 MIN: CPT | Performed by: INTERNAL MEDICINE

## 2025-01-02 NOTE — PROGRESS NOTES
Patient ID: Leopoldo Angel is a 79 y.o. male.  Referring Physician: No referring provider defined for this encounter.  Primary Care Provider: No primary care provider on file.    Date of Service:  1/2/2025    Oncology History    No history exists.        History of Present Illness:  He reports feeling very well, excellent energy, no major problems from cyclosporine including gum issues, nausea, vomiting.  Reports adherence to his other medications.  Has not had bleeding or bruising episodes.  Overall quite pleased with how he is doing.  Notes that his spouse is also doing well after her recent surgery.        Assessment & Plan  Pure red cell aplasia (Multi)  1/2/2024: Today, I connected virtually with Dr. Angel.  We discussed his current cyclosporine levels - at 100mg twice daily, he has been running 140s-160s and stable enough that I think he can now move to monthly labs - we probably won't move beyond that.  We will also start to increase the time between his interval visits.    I reviewed again my plan to keep him on medicine with stable blood counts for a significant period of time (I think previously has had 4 to 6 months, today I actually said closer to a year, though I also discussed with him that this is the area of hematology that lacks definitive protocols and is typically more arts and science and he would probably get slightly different advice on when to taper with other physicians) but at some point we will try again to slowly taper the cyclosporine and see if we can maintain the blood counts that we see.  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 (we missed this topic again today)   Treatment:  -continue cyclosporine at 100mg bid - goal levels of  still - currently in range and quite stable  Monitoring:   -Continuing CBCs, CMP, and cyclosporine locally - moving to monthly  testing  -No additional supportive care  Antimicrobial prophylaxis:  -Continue acyclovir  -Continue fluconazole  -Continue trimethoprim-sulfamethoxazole    virtual follow-up in another month, but follow-up to be determined      Elevated liver transaminase level  These are less than 2 times upper limit of normal, but I discussed that they could either be from his fluconazole or cyclosporine.  If they are from the cyclosporine manage visit they will resolve on their own, but will continue to need to observe these every month and if they do increase to 2 times upper limit of normal we may need to try to hold some medications likely the fluconazole first to try to get these to correct to normal.  -Continue to observe  Primary hypertension  Reports that with the addition of amlodipine his home blood pressure monitoring has been much better.  Likely to follow-up with a cardiologist moving forward, lower extremity swelling has not been significantly difficult to manage.  -May continue home regimen for antihypertensives               Philip Tovar MD    Virtual or Telephone Consent  An interactive audio and video telecommunication system which permits real time communications between the patient (at the originating site) and provider (at the distant site) was utilized to provide this telehealth service.   Verbal consent was requested and obtained from Leopoldo Angel on this date, 01/02/25 for a telehealth visit.         SUBJECTIVE:     Reviewed past medical, surgical and family history for updates  Reviewed Social history for updates.    Review of Systems   Constitutional:  Negative for activity change, appetite change, chills, fatigue, fever and unexpected weight change.   HENT:  Negative for congestion, dental problem, mouth sores, nosebleeds, rhinorrhea, sinus pressure, sinus pain and sore throat.    Eyes:         Negative for Dry eye   Respiratory:  Negative for cough and shortness of breath.    Cardiovascular:   Negative for chest pain and leg swelling.   Gastrointestinal:  Negative for abdominal pain, constipation, diarrhea, nausea and vomiting.   Genitourinary:  Negative for dysuria, frequency and urgency.   Musculoskeletal:  Negative for arthralgias, back pain and joint swelling.   Skin:  Negative for rash.   Neurological:  Negative for dizziness, weakness, numbness and headaches.   Psychiatric/Behavioral:  Negative for confusion and dysphoric mood. The patient is not nervous/anxious.        Reviewed Home Medications & Adherence:     OBJECTIVE:    This was a virtual visit - no vital signs or physical exam could be performed.       Laboratory: The pertinent pathology results were reviewed and discussed with the patient including: Most recent cyclosporine results which were 141  -We also reviewed his CBC results which shows a hemoglobin in the normal range  -Chemistries were observed and I noted that his AST and ALT are elevated for the last several blood tests, though all at less than 2 x 0 point abnormal

## 2025-01-02 NOTE — LETTER
January 3, 2025      TO: MD Armand Matta MD  [address] [place] [address] Hematology And Oncology  [place] [phone]       Regarding:   Patient:  :  Visit Date: Leopoldo Angel  1945  2025       Dear Dr. Armand Oconnell MD     I saw our mutual patient Leopoldo Angel for an interval visit in the malignant hematology clinic at [facility].  Please see below for my notes from this encounter. Please do not hesitate to call me if you have any questions. I look forward to continuing to follow your patient along with you.      Sincerely,    Philip Tovar MD         CC: Armand Oconnell MD  [address] [place] [address] Hematology And Oncology  [place] [phone]  Via Fax: [phone]    Armand Oconnell MD  [address] [place] [address] Hematology And Oncology  [place] [id number]       Please see my documentation below:   Patient ID: Leopoldo Angel is a 79 y.o. male.  Referring Physician: No referring provider defined for this encounter.  Primary Care Provider: No primary care provider on file.    Date of Service:  2025    Oncology History    No history exists.        History of Present Illness:  He reports feeling very well, excellent energy, no major problems from cyclosporine including gum issues, nausea, vomiting.  Reports adherence to his other medications.  Has not had bleeding or bruising episodes.  Overall quite pleased with how he is doing.  Notes that his spouse is also doing well after her recent surgery.        Assessment & Plan  Pure red cell aplasia (Multi)  2024: Today, I connected virtually with Dr. Angel.  We discussed his current cyclosporine levels - at 100mg twice daily, he has been running 140s-160s and stable enough that I think he can now move to monthly labs - we probably won't move beyond that.  We will also start to increase the time between his interval visits.    I reviewed again my plan to keep him on medicine with stable blood counts for a  significant period of time (I think previously has had 4 to 6 months, today I actually said closer to a year, though I also discussed with him that this is the area of hematology that lacks definitive protocols and is typically more arts and science and he would probably get slightly different advice on when to taper with other physicians) but at some point we will try again to slowly taper the cyclosporine and see if we can maintain the blood counts that we see.  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 (we missed this topic again today)   Treatment:  -continue cyclosporine at 100mg bid - goal levels of  still - currently in range and quite stable  Monitoring:   -Continuing CBCs, CMP, and cyclosporine locally - moving to monthly testing  -No additional supportive care  Antimicrobial prophylaxis:  -Continue acyclovir  -Continue fluconazole  -Continue trimethoprim-sulfamethoxazole    virtual follow-up in another month, but follow-up to be determined      Elevated liver transaminase level  These are less than 2 times upper limit of normal, but I discussed that they could either be from his fluconazole or cyclosporine.  If they are from the cyclosporine manage visit they will resolve on their own, but will continue to need to observe these every month and if they do increase to 2 times upper limit of normal we may need to try to hold some medications likely the fluconazole first to try to get these to correct to normal.  -Continue to observe  Primary hypertension  Reports that with the addition of amlodipine his home blood pressure monitoring has been much better.  Likely to follow-up with a cardiologist moving forward, lower extremity swelling has not been significantly difficult to manage.  -May continue home regimen for antihypertensives               Philip Tovar MD    Virtual or  Telephone Consent  An interactive audio and video telecommunication system which permits real time communications between the patient (at the originating site) and provider (at the distant site) was utilized to provide this telehealth service.   Verbal consent was requested and obtained from Leopoldo Angel on this date, 01/02/25 for a telehealth visit.         SUBJECTIVE:     Reviewed past medical, surgical and family history for updates  Reviewed Social history for updates.    Review of Systems   Constitutional:  Negative for activity change, appetite change, chills, fatigue, fever and unexpected weight change.   HENT:  Negative for congestion, dental problem, mouth sores, nosebleeds, rhinorrhea, sinus pressure, sinus pain and sore throat.    Eyes:         Negative for Dry eye   Respiratory:  Negative for cough and shortness of breath.    Cardiovascular:  Negative for chest pain and leg swelling.   Gastrointestinal:  Negative for abdominal pain, constipation, diarrhea, nausea and vomiting.   Genitourinary:  Negative for dysuria, frequency and urgency.   Musculoskeletal:  Negative for arthralgias, back pain and joint swelling.   Skin:  Negative for rash.   Neurological:  Negative for dizziness, weakness, numbness and headaches.   Psychiatric/Behavioral:  Negative for confusion and dysphoric mood. The patient is not nervous/anxious.        Reviewed Home Medications & Adherence:     OBJECTIVE:    This was a virtual visit - no vital signs or physical exam could be performed.       Laboratory: The pertinent pathology results were reviewed and discussed with the patient including: Most recent cyclosporine results which were 141  -We also reviewed his CBC results which shows a hemoglobin in the normal range  -Chemistries were observed and I noted that his AST and ALT are elevated for the last several blood tests, though all at less than 2 x 0 point abnormal

## 2025-01-03 PROBLEM — R74.01 ELEVATED LIVER TRANSAMINASE LEVEL: Status: ACTIVE | Noted: 2025-01-03

## 2025-01-03 ASSESSMENT — ENCOUNTER SYMPTOMS
NUMBNESS: 0
HEADACHES: 0
SINUS PAIN: 0
CONSTIPATION: 0
ABDOMINAL PAIN: 0
FREQUENCY: 0
WEAKNESS: 0
FATIGUE: 0
SORE THROAT: 0
ACTIVITY CHANGE: 0
SHORTNESS OF BREATH: 0
FEVER: 0
COUGH: 0
NERVOUS/ANXIOUS: 0
NAUSEA: 0
RHINORRHEA: 0
JOINT SWELLING: 0
DYSURIA: 0
APPETITE CHANGE: 0
DIARRHEA: 0
CHILLS: 0
DIZZINESS: 0
ARTHRALGIAS: 0
SINUS PRESSURE: 0
VOMITING: 0
BACK PAIN: 0
DYSPHORIC MOOD: 0
UNEXPECTED WEIGHT CHANGE: 0
CONFUSION: 0

## 2025-01-03 NOTE — ASSESSMENT & PLAN NOTE
1/2/2024: Today, I connected virtually with Dr. Angel.  We discussed his current cyclosporine levels - at 100mg twice daily, he has been running 140s-160s and stable enough that I think he can now move to monthly labs - we probably won't move beyond that.  We will also start to increase the time between his interval visits.    I reviewed again my plan to keep him on medicine with stable blood counts for a significant period of time (I think previously has had 4 to 6 months, today I actually said closer to a year, though I also discussed with him that this is the area of hematology that lacks definitive protocols and is typically more arts and science and he would probably get slightly different advice on when to taper with other physicians) but at some point we will try again to slowly taper the cyclosporine and see if we can maintain the blood counts that we see.  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

## 2025-01-03 NOTE — ASSESSMENT & PLAN NOTE
These are less than 2 times upper limit of normal, but I discussed that they could either be from his fluconazole or cyclosporine.  If they are from the cyclosporine manage visit they will resolve on their own, but will continue to need to observe these every month and if they do increase to 2 times upper limit of normal we may need to try to hold some medications likely the fluconazole first to try to get these to correct to normal.  -Continue to observe

## 2025-01-03 NOTE — ASSESSMENT & PLAN NOTE
Reports that with the addition of amlodipine his home blood pressure monitoring has been much better.  Likely to follow-up with a cardiologist moving forward, lower extremity swelling has not been significantly difficult to manage.  -May continue home regimen for antihypertensives

## 2025-01-30 ENCOUNTER — TELEMEDICINE (OUTPATIENT)
Dept: HEMATOLOGY/ONCOLOGY | Facility: CLINIC | Age: 80
End: 2025-01-30
Payer: COMMERCIAL

## 2025-01-30 DIAGNOSIS — D61.01 PURE RED CELL APLASIA (MULTI): ICD-10-CM

## 2025-01-30 PROCEDURE — 1159F MED LIST DOCD IN RCRD: CPT | Performed by: NURSE PRACTITIONER

## 2025-01-30 PROCEDURE — G2211 COMPLEX E/M VISIT ADD ON: HCPCS | Performed by: NURSE PRACTITIONER

## 2025-01-30 PROCEDURE — 1160F RVW MEDS BY RX/DR IN RCRD: CPT | Performed by: NURSE PRACTITIONER

## 2025-01-30 PROCEDURE — 99212 OFFICE O/P EST SF 10 MIN: CPT | Performed by: NURSE PRACTITIONER

## 2025-01-30 NOTE — PROGRESS NOTES
Patient ID: Leopoldo Angel is a 79 y.o. male.  Referring Physician: Philip Tovar MD  [id number] [place] [phone]  Primary Care Provider: No primary care provider on file.    Date of Service:  1/30/2025  Assessment & Plan  Pure red cell aplasia (Multi)  1/2/2024: Today, I connected virtually with Dr. Angel.  He is feeling well with no complaints.  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 Tovar)   Treatment:  -continue cyclosporine at 100mg bid - goal levels of  still - currently stable  Monitoring:   -Continuing CBCs, CMP, and cyclosporine locally - monthly   -No additional supportive care  Antimicrobial prophylaxis:  - Hold fluconazole until next lab check d/t elevate liver enzymes  - Continue acyclovir  - Continue trimethoprim-sulfamethoxazole    SUBJECTIVE:  History of Present Illness:  This is a virtual visit and verbal consent was requested and obtained from patient on this date for a telehealth visit.  No physical exam was performed d/t virtual visit.      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  He has remained healthy with no recent infections or illnesses. He has no complaints today.  He follows regularly with his dentist.       Laboratory:  The pertinent laboratory results were reviewed and discussed with the patient.    Lab Results   Component Value Date    WBC 4.3 (L) 10/21/2024    HCT 31.9 (L) 10/21/2024    HGB 10.2 (L) 10/21/2024     (L) 10/21/2024    K 4.6 10/21/2024    CALCIUM 9.7 10/21/2024     10/21/2024    MG 1.88 10/21/2024    BILITOT 0.9 10/21/2024    ALT 64 (H) 10/21/2024    AST 55 (H) 10/21/2024    BUN 27 (H) 10/21/2024    CREATININE 1.23 10/21/2024      Note: for a comprehensive list of the patient's lab results, access the Results  Review activity.    RTC:  4 weeks virtually with Dr. Tovar (3 weeks given MD availability)    Yojana Stein, PERRY-CNP

## 2025-01-30 NOTE — ASSESSMENT & PLAN NOTE
1/2/2024: Today, I connected virtually with Dr. Angel.  He is feeling well with no complaints.  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 Tovar)   Treatment:  -continue cyclosporine at 100mg bid - goal levels of  still - currently stable  Monitoring:   -Continuing CBCs, CMP, and cyclosporine locally - monthly   -No additional supportive care  Antimicrobial prophylaxis:  - Hold fluconazole until next lab check d/t elevate liver enzymes  - Continue acyclovir  - Continue trimethoprim-sulfamethoxazole

## 2025-02-20 ENCOUNTER — TELEMEDICINE (OUTPATIENT)
Dept: HEMATOLOGY/ONCOLOGY | Facility: CLINIC | Age: 80
End: 2025-02-20
Payer: COMMERCIAL

## 2025-02-20 DIAGNOSIS — D61.01 PURE RED CELL APLASIA (MULTI): ICD-10-CM

## 2025-02-20 NOTE — ASSESSMENT & PLAN NOTE
2/20/2025: Today, I connected virtually with Dr. Angel.  He is feeling well with no complaints.  His AST is now 2 x ULN and the ALT is 1.7 x ULN -and so they are continuing to improve after holding the fluconazole.  I think we can continue to hold fluconazole for now until the correct completely, and then try to restart this medication.    I noted that with the stability of his labs, in particular his cyclosporine, I think it is reasonable to start stretching out his visits, though the minimum he should be getting laboratory work while on cyclosporine is monthly which she will continue to do.  He is very proactive with forwarding this to us and I think this system works well.    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 Tovar)   Treatment:  -continue cyclosporine at 100mg bid - goal levels of  still - currently stable  Monitoring:   -Continuing CBCs, CMP, and cyclosporine locally - monthly   -No additional supportive care  Antimicrobial prophylaxis:  - Hold fluconazole until next lab check d/t elevate liver enzymes  - Continue acyclovir  - Continue trimethoprim-sulfamethoxazole

## 2025-02-20 NOTE — PROGRESS NOTES
Patient ID: Leopoldo Angel is a 79 y.o. male.  Referring Physician: Yojana Stein, APRN-CNP  [address]  [place] [phone]  Primary Care Provider: No primary care provider on file.    Date of Service:  2/20/2025    Oncology History    No history exists.        History of Present Illness:  We were able to connect virtually today.  Patient continues to report that he tolerates the cyclosporine very well.  Has not noticed any significant side effects.  He also reviewed that he is aware of his situation with his liver function tests where they have intermittently become modestly elevated for at least the last 10 years.  Typically they do correct.  I reviewed his most recent LFT trends, where we are seeing them downtrend.  He noted that his spouse is doing quite well, and is was struggling with pain postoperatively but is now doing better.  He had questions about travel and travel risks.        Assessment & Plan  Pure red cell aplasia (Multi)  2/20/2025: Today, I connected virtually with Dr. Angel.  He is feeling well with no complaints.  His AST is now 2 x ULN and the ALT is 1.7 x ULN -and so they are continuing to improve after holding the fluconazole.  I think we can continue to hold fluconazole for now until the correct completely, and then try to restart this medication.    I noted that with the stability of his labs, in particular his cyclosporine, I think it is reasonable to start stretching out his visits, though the minimum he should be getting laboratory work while on cyclosporine is monthly which she will continue to do.  He is very proactive with forwarding this to us and I think this system works well.    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 Tovar)   Treatment:  -continue cyclosporine at 100mg bid - goal levels of  still - currently stable  Monitoring:   -Continuing CBCs, CMP, and cyclosporine  locally - monthly   -No additional supportive care  Antimicrobial prophylaxis:  - Hold fluconazole until next lab check d/t elevate liver enzymes  - Continue acyclovir  - Continue trimethoprim-sulfamethoxazole               Philip Tovar MD    Virtual or Telephone Consent  An interactive audio and video telecommunication system which permits real time communications between the patient (at the originating site) and provider (at the distant site) was utilized to provide this telehealth service.   Verbal consent was requested and obtained from Leopoldo Angel on this date, 02/20/25 for a telehealth visit.         SUBJECTIVE:     Reviewed past medical, surgical and family history for updates  Reviewed Social history for updates.    Review of Systems   Constitutional:  Negative for activity change, appetite change, chills, diaphoresis, fatigue, fever and unexpected weight change.   HENT:  Negative for congestion, mouth sores, nosebleeds, rhinorrhea, sinus pressure, sinus pain and sore throat.    Eyes:  Negative for visual disturbance.   Respiratory:  Negative for cough and shortness of breath.    Cardiovascular:  Negative for chest pain and leg swelling.   Gastrointestinal:  Negative for abdominal pain, constipation, diarrhea, nausea and vomiting.   Genitourinary:  Negative for difficulty urinating and flank pain.   Musculoskeletal:  Negative for back pain and joint swelling.   Skin:  Negative for rash.   Neurological:  Negative for weakness, light-headedness, numbness and headaches.   Hematological:  Negative for adenopathy. Does not bruise/bleed easily.   Psychiatric/Behavioral:  Negative for confusion and dysphoric mood. The patient is not nervous/anxious.        Reviewed Home Medications & Adherence:     OBJECTIVE:    This was a virtual visit - no vital signs or physical exam could be performed.       Laboratory: The pertinent pathology results were reviewed and discussed with the patient including:   Most  recent CBC with hemoglobin of 15.3, white blood cell 3.4, platelets of 129.  Chemistries notable for creatinine of 1.09, AST 53, ALT 76  Cyclosporine from 2/13 was 106

## 2025-02-22 ASSESSMENT — ENCOUNTER SYMPTOMS
DIAPHORESIS: 0
DIFFICULTY URINATING: 0
ABDOMINAL PAIN: 0
SHORTNESS OF BREATH: 0
UNEXPECTED WEIGHT CHANGE: 0
CONSTIPATION: 0
SINUS PAIN: 0
LIGHT-HEADEDNESS: 0
ADENOPATHY: 0
BACK PAIN: 0
NAUSEA: 0
FATIGUE: 0
CHILLS: 0
BRUISES/BLEEDS EASILY: 0
CONFUSION: 0
SINUS PRESSURE: 0
JOINT SWELLING: 0
HEADACHES: 0
WEAKNESS: 0
DIARRHEA: 0
APPETITE CHANGE: 0
RHINORRHEA: 0
NERVOUS/ANXIOUS: 0
NUMBNESS: 0
SORE THROAT: 0
ACTIVITY CHANGE: 0
VOMITING: 0
FEVER: 0
DYSPHORIC MOOD: 0
FLANK PAIN: 0
COUGH: 0

## 2025-03-17 DIAGNOSIS — D61.01 PURE RED CELL APLASIA (MULTI): ICD-10-CM

## 2025-03-17 RX ORDER — FLUCONAZOLE 200 MG/1
200 TABLET ORAL DAILY
Qty: 90 TABLET | Refills: 1 | Status: SHIPPED | OUTPATIENT
Start: 2025-03-17 | End: 2025-09-13

## 2025-03-17 RX ORDER — SULFAMETHOXAZOLE AND TRIMETHOPRIM 800; 160 MG/1; MG/1
1 TABLET ORAL
Qty: 12 TABLET | Refills: 5 | Status: SHIPPED | OUTPATIENT
Start: 2025-03-17 | End: 2025-09-13

## 2025-03-17 RX ORDER — ACYCLOVIR 400 MG/1
400 TABLET ORAL 2 TIMES DAILY
Qty: 180 TABLET | Refills: 1 | Status: SHIPPED | OUTPATIENT
Start: 2025-03-17 | End: 2025-09-13

## 2025-04-17 ENCOUNTER — TELEMEDICINE (OUTPATIENT)
Dept: HEMATOLOGY/ONCOLOGY | Facility: CLINIC | Age: 80
End: 2025-04-17
Payer: COMMERCIAL

## 2025-04-17 DIAGNOSIS — D61.01 PURE RED CELL APLASIA (MULTI): ICD-10-CM

## 2025-04-17 PROCEDURE — G2211 COMPLEX E/M VISIT ADD ON: HCPCS | Performed by: NURSE PRACTITIONER

## 2025-04-17 PROCEDURE — 99213 OFFICE O/P EST LOW 20 MIN: CPT | Performed by: NURSE PRACTITIONER

## 2025-04-17 PROCEDURE — 1159F MED LIST DOCD IN RCRD: CPT | Performed by: NURSE PRACTITIONER

## 2025-04-17 PROCEDURE — 1160F RVW MEDS BY RX/DR IN RCRD: CPT | Performed by: NURSE PRACTITIONER

## 2025-04-17 NOTE — ASSESSMENT & PLAN NOTE
4/16/2025: Today, I connected virtually with Dr. Angel.  He is feeling well with no complaints.  We can continue to hold fluconazole for now until the LFTs correct completely, and then try to restart this medication.    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 Tovar)   Treatment:  -continue cyclosporine at 100mg bid - goal levels of  still - currently stable  Monitoring:   -Continuing CBCs, CMP, and cyclosporine locally - monthly   -No additional supportive care  Antimicrobial prophylaxis:  - Hold fluconazole until next lab check d/t elevate liver enzymes  - Continue acyclovir  - Continue trimethoprim-sulfamethoxazole

## 2025-04-17 NOTE — PROGRESS NOTES
Patient ID: Leopoldo Angel is a 80 y.o. male.  Referring Physician: Philip Tovar MD  [id number] [place] [phone]  Primary Care Provider: No primary care provider on file.    Date of Service:  4/17/2025  Assessment & Plan  Pure red cell aplasia (Multi)  4/16/2025: Today, I connected virtually with Dr. Angel.  He is feeling well with no complaints.  We can continue to hold fluconazole for now until the LFTs correct completely, and then try to restart this medication.    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 Tovar)   Treatment:  -continue cyclosporine at 100mg bid - goal levels of  still - currently stable  Monitoring:   -Continuing CBCs, CMP, and cyclosporine locally - monthly   -No additional supportive care  Antimicrobial prophylaxis:  - Hold fluconazole until next lab check d/t elevate liver enzymes  - Continue acyclovir  - Continue trimethoprim-sulfamethoxazole     SUBJECTIVE:  History of Present Illness:  Virtual or Telephone Consent    An interactive audio and video telecommunication system which permits real time communications between the patient (at the originating site) and provider (at the distant site) was utilized to provide this telehealth service.   Verbal consent was requested and obtained from Leopoldo Angel on this date, 04/17/25 for a telehealth visit and the patient's location was confirmed at the time of the visit.     Patient reports feeling well.  He was having some shoulder pain, but this was after working out and resolved.  Overall doing well on CSA.      RTC:  Monthly labs  Q 2 month MD/DEX follow up    PERRY Barrios-CNP

## 2025-06-12 ENCOUNTER — APPOINTMENT (OUTPATIENT)
Dept: HEMATOLOGY/ONCOLOGY | Facility: CLINIC | Age: 80
End: 2025-06-12
Payer: COMMERCIAL

## 2025-06-19 ENCOUNTER — TELEMEDICINE (OUTPATIENT)
Dept: HEMATOLOGY/ONCOLOGY | Facility: CLINIC | Age: 80
End: 2025-06-19
Payer: COMMERCIAL

## 2025-06-19 DIAGNOSIS — D61.01 PURE RED CELL APLASIA (MULTI): ICD-10-CM

## 2025-06-19 PROCEDURE — 1159F MED LIST DOCD IN RCRD: CPT | Performed by: INTERNAL MEDICINE

## 2025-06-19 PROCEDURE — 99213 OFFICE O/P EST LOW 20 MIN: CPT | Performed by: INTERNAL MEDICINE

## 2025-06-19 PROCEDURE — 1160F RVW MEDS BY RX/DR IN RCRD: CPT | Performed by: INTERNAL MEDICINE

## 2025-06-19 PROCEDURE — G2211 COMPLEX E/M VISIT ADD ON: HCPCS | Performed by: INTERNAL MEDICINE

## 2025-06-19 NOTE — ASSESSMENT & PLAN NOTE
4/16/2025: Today, I connected virtually with Dr. Angel.  He is feeling well with no complaints.  We can continue to hold fluconazole for now until the LFTs correct completely, and then try to restart this medication.    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 Tovar)   Treatment:  -start slow taper:  - 6/19 move to  cyclosporine at 100mg qAM, and 75mg qPM  - 7/17 move to cyclosporine 75mg twice daily   - 8/14 move to cyclosporine 75mg qAM and 50mg qPM  - 9/11 move to cyclosporine 50mg twice daily   - monitor effects and reconsider timing of taper after September   Monitoring:   -Continuing CBCs, CMP, and cyclosporine locally - monthly   -No additional supportive care  Antimicrobial prophylaxis:  - continue fluconazole  - Continue acyclovir  - Continue trimethoprim-sulfamethoxazole

## 2025-06-19 NOTE — LETTER
2025      TO: MD Armand Matta MD  [address] [place] [address] Hematology And Oncology  [place] [phone]       Regarding:   Patient:  :  Visit Date: Leopoldo Angel  1945  2025       Dear Dr. Armand Oconnell MD     I saw our mutual patient Leopoldo Angel for an interval visit in the malignant hematology clinic at [facility].  Please see below for my notes from this encounter. Please do not hesitate to call me if you have any questions. I look forward to continuing to follow your patient along with you.      Sincerely,    Philip Tovar MD         CC: Armand Oconnell MD  [address] [place] [address] Hematology And Oncology  [place] [phone]  Via Fax: [phone]    Armand Oconnell MD  [address] [place] [address] Hematology And Oncology  [place] [phone]       Please see my documentation below:   Patient ID: Leopoldo Angel is a 80 y.o. male.  Referring Physician: Yojana Stein, APRN-CNP  [address] [place] [address]  Primary Care Provider: No primary care provider on file.    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  Retic count was profoundly low. Initial evaluation with normal folate, normal to high B12, high ferritin, high to normal iron,  parvovirus B19 serology positive for IgG but not IgM    Notably, haptoglobin low at < 10, LDH slightly high, but negative DAPHNE and bilirubin normal.     Had a bone marrow biopsy completed 3/18 demonstrating:   BONE MARROW CLOT WITH ASPIRATE AND CORE WITH TOUCH PREP, SITE UNSPECIFIED ([facility], [id number], 3/18/2024):     -- HYPERCELLULAR BONE MARROW (50-60%) WITH MARKED ERYTHROID HYPOPLASIA AND SCATTERED INTERSTITIAL LYMPHOID AGGREGATES, SOME WITH MINUTE GRANULOMAS, SEE NOTE.   NOTE: Per report, flow cytometry detected a small monoclonal lambda-restricted,  CD5-negative, [id number] B-cell population (6% of total events. The aggregates seen are predominantly composed of small T lymphocytes. 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 The marked erythroid hypoplasia with left shifted erythropoiesis are suggestive of red cell aplasia. Clinical and radiological correlation is recommended.    Treatment:   I. Prednisone - 1mg/kg  Initiated in April 2024, patient had a near complete response with hemoglobin approaching 12 by the end of 4 to 6 weeks, subsequently tapered prednisone and came completely off in early July 2020.  Patient struggled with fatigue at lower doses of prednisone especially after going under 20 mg, this improved over time.    Unfortunately, in July 2024 patient's hemoglobin decreased again to less than 10, and he developed a new onset severe neutropenia with an ANC of 0.02.  In the setting of repeat bone marrow biopsy was obtained on 7/18/2024:  BONE MARROW CLOT WITH ASPIRATE AND CORE WITH TOUCH PREP, LEFT ILIAC CREST:       --HYPERCELLULAR BONE MARROW (60%) WITH PURE RED CELL APLASIA AND LOW LEVEL INVOLVEMENT BY LOW-GRADE CD5-, CD10- B CELL LYMPHOPROLIFERATIVE DISORDER, SEE NOTE.  --MARKED POLYCLONAL T CELL LYMPHOCYTOSIS, SEE NOTE     NOTE: The marrow shows a marked paucity of red cell progenitors consistent with a pure red cell aplasia. Additionally, there is a prominent marrow lymphocytosis with a clonal CD5-, CD10- B cell population detected by flow cytometry. Although clonal B cells were detected at roughly 19% of all events by flow cytometry, immunostains reveal only roughly 5% B cells with a marked T cell lymphocytosis noted (roughly 40% of marrow) with vast majority of lymphoid cells T cells. T cells are polyclonal by TRBC1 staining on flow cytometry with no evidence of an abnormal phenotype or  "increase in gamma/delta T cells. Therefore the T cells appear reactive but markedly increased and may be pathogenically related to the red cell aplasia. Genetic studies are pending to help in the evaluation of the process. Clinical correlation recommended.    Treatment:   II. Rituximab 375mg/m2 IV x4 doses - adminstered through August 2024.   Response: By the end of September there was no significant response to therapy    III. Cyclosporine - initated cyclosporine (starting dose of 75mg twice daily in combinatino with fluconazole) in 10/2024 with target therapeutic level of   10/21/2024 - increased to 100mg twice daily based on levels of 53.      Date Level (ng/mL)   10/21/2024 102   10/28/2024 178   11/7/2024 126   11/26/2024 159   12/9/2024 141   12/23/2024 141               Response: by end of October, hgb approaching normal.  Energy returning to normal     Feels \"shocked\" at how little side effects that he's had.  Finds that he's having good saliva production.  Having dental checks, and flossing 2x/day, being compulsive.  He worries about the immunocompromised, and when he looked at the lab work, he noticed that the     One of the symptoms he gets is itching at night.  He recall         Assessment & Plan  Pure red cell aplasia (Multi)  4/16/2025: Today, I connected virtually with Dr. Angel.  He is feeling well with no complaints.  We can continue to hold fluconazole for now until the LFTs correct completely, and then try to restart this medication.    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 Tovar)   Treatment:  -start slow taper:  - 6/19 move to  cyclosporine at 100mg qAM, and 75mg qPM  - 7/17 move to cyclosporine 75mg twice daily   - 8/14 move to cyclosporine 75mg qAM and 50mg qPM  - 9/11 move to cyclosporine 50mg twice daily   - monitor effects and reconsider timing of taper after September   Monitoring: "   -Continuing CBCs, CMP, and cyclosporine locally - monthly   -No additional supportive care  Antimicrobial prophylaxis:  - continue fluconazole  - Continue acyclovir  - Continue trimethoprim-sulfamethoxazole             Subjective    Virtual or Telephone Consent    An interactive audio and video telecommunication system which permits real time communications between the patient (at the originating site) and provider (at the distant site) was utilized to provide this telehealth service.   Verbal consent was requested and obtained from Leopoldo Angel on this date, 06/19/25 for a telehealth visit and the patient's location was confirmed at the time of the visit.    Reviewed and Past Medical History, Past Surgical History, Family History, and Social History:    Review of Systems    Home Medications and Adherence Reviewed with Patient.       Objective     VS:  There were no vitals taken for this visit.  BSA: There is no height or weight on file to calculate BSA.  KPS: 100    Physical Exam    Laboratory:  Pertinent laboratory results were reviewed and discussed with patient, notably:   Hgb and plts still in the nromal range   We reviewed serial AST/ALT over time.             Philip Tovar MD

## 2025-06-19 NOTE — PROGRESS NOTES
Patient ID: Leopoldo Angel is a 80 y.o. male.  Referring Physician: Yojana Stein, APRN-CNP  [address] [place] [address]  Primary Care Provider: No primary care provider on file.    Date of Service:  6/19/2025    Presented about 1-2 months after SARS-Cov-2 infection treated with molnuparavir in 12/2023 with severe, newly transfusion dependent anemia.  Retic count was profoundly low. Initial evaluation with normal folate, normal to high B12, high ferritin, high to normal iron,  parvovirus B19 serology positive for IgG but not IgM    Notably, haptoglobin low at < 10, LDH slightly high, but negative DAPHNE and bilirubin normal.     Had a bone marrow biopsy completed 3/18 demonstrating:   BONE MARROW CLOT WITH ASPIRATE AND CORE WITH TOUCH PREP, SITE UNSPECIFIED ([facility], [id number], 3/18/2024):     -- HYPERCELLULAR BONE MARROW (50-60%) WITH MARKED ERYTHROID HYPOPLASIA AND SCATTERED INTERSTITIAL LYMPHOID AGGREGATES, SOME WITH MINUTE GRANULOMAS, SEE NOTE.   NOTE: Per report, flow cytometry detected a small monoclonal lambda-restricted, CD5-negative, [id number] B-cell population (6% of total events. The aggregates seen are predominantly composed of small T lymphocytes. 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 The marked erythroid hypoplasia with left shifted erythropoiesis are suggestive of red cell aplasia. Clinical and radiological correlation is recommended.    Treatment:   I. Prednisone - 1mg/kg  Initiated in April 2024, patient had a near complete response with hemoglobin approaching 12 by the end of 4 to 6 weeks, subsequently tapered prednisone and came completely off in early July 2020.  Patient struggled with fatigue at lower doses of prednisone especially after going under 20 mg, this improved over time.    Unfortunately, in July 2024  patient's hemoglobin decreased again to less than 10, and he developed a new onset severe neutropenia with an ANC of 0.02.  In the setting of repeat bone marrow biopsy was obtained on 7/18/2024:  BONE MARROW CLOT WITH ASPIRATE AND CORE WITH TOUCH PREP, LEFT ILIAC CREST:       --HYPERCELLULAR BONE MARROW (60%) WITH PURE RED CELL APLASIA AND LOW LEVEL INVOLVEMENT BY LOW-GRADE CD5-, CD10- B CELL LYMPHOPROLIFERATIVE DISORDER, SEE NOTE.  --MARKED POLYCLONAL T CELL LYMPHOCYTOSIS, SEE NOTE     NOTE: The marrow shows a marked paucity of red cell progenitors consistent with a pure red cell aplasia. Additionally, there is a prominent marrow lymphocytosis with a clonal CD5-, CD10- B cell population detected by flow cytometry. Although clonal B cells were detected at roughly 19% of all events by flow cytometry, immunostains reveal only roughly 5% B cells with a marked T cell lymphocytosis noted (roughly 40% of marrow) with vast majority of lymphoid cells T cells. T cells are polyclonal by TRBC1 staining on flow cytometry with no evidence of an abnormal phenotype or increase in gamma/delta T cells. Therefore the T cells appear reactive but markedly increased and may be pathogenically related to the red cell aplasia. Genetic studies are pending to help in the evaluation of the process. Clinical correlation recommended.    Treatment:   II. Rituximab 375mg/m2 IV x4 doses - adminstered through August 2024.   Response: By the end of September there was no significant response to therapy    III. Cyclosporine - initated cyclosporine (starting dose of 75mg twice daily in combinatino with fluconazole) in 10/2024 with target therapeutic level of   10/21/2024 - increased to 100mg twice daily based on levels of 53.      Date Level (ng/mL)   10/21/2024 102   10/28/2024 178   11/7/2024 126   11/26/2024 159   12/9/2024 141   12/23/2024 141               Response: by end of October, hgb approaching normal.  Energy returning to normal  "    Feels \"shocked\" at how little side effects that he's had.  Finds that he's having good saliva production.  Having dental checks, and flossing 2x/day, being compulsive.  He worries about the immunocompromised, and when he looked at the lab work, he noticed that the     One of the symptoms he gets is itching at night.  He recall         Assessment & Plan  Pure red cell aplasia (Multi)  4/16/2025: Today, I connected virtually with Dr. Angel.  He is feeling well with no complaints.  We can continue to hold fluconazole for now until the LFTs correct completely, and then try to restart this medication.    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 Tovar)   Treatment:  -start slow taper:  - 6/19 move to  cyclosporine at 100mg qAM, and 75mg qPM  - 7/17 move to cyclosporine 75mg twice daily   - 8/14 move to cyclosporine 75mg qAM and 50mg qPM  - 9/11 move to cyclosporine 50mg twice daily   - monitor effects and reconsider timing of taper after September   Monitoring:   -Continuing CBCs, CMP, and cyclosporine locally - monthly   -No additional supportive care  Antimicrobial prophylaxis:  - continue fluconazole  - Continue acyclovir  - Continue trimethoprim-sulfamethoxazole             Subjective     Virtual or Telephone Consent    An interactive audio and video telecommunication system which permits real time communications between the patient (at the originating site) and provider (at the distant site) was utilized to provide this telehealth service.   Verbal consent was requested and obtained from Leopoldo Angel on this date, 06/19/25 for a telehealth visit and the patient's location was confirmed at the time of the visit.    Reviewed and Past Medical History, Past Surgical History, Family History, and Social History:    Review of Systems    Home Medications and Adherence Reviewed with Patient.       Objective      VS:  There were no " vitals taken for this visit.  BSA: There is no height or weight on file to calculate BSA.  KPS: 100    Physical Exam    Laboratory:  Pertinent laboratory results were reviewed and discussed with patient, notably:   Hgb and plts still in the nromal range   We reviewed serial AST/ALT over time.             Philip Tovar MD

## 2025-06-20 RX ORDER — CYCLOSPORINE 25 MG/1
75 CAPSULE, LIQUID FILLED ORAL DAILY
Qty: 90 CAPSULE | Refills: 11 | Status: SHIPPED | OUTPATIENT
Start: 2025-06-20 | End: 2026-06-20

## 2025-08-18 DIAGNOSIS — D61.01 PURE RED CELL APLASIA (MULTI): ICD-10-CM

## 2025-08-18 RX ORDER — CYCLOSPORINE 25 MG/1
75 CAPSULE, LIQUID FILLED ORAL DAILY
Qty: 150 CAPSULE | Refills: 3 | Status: SHIPPED | OUTPATIENT
Start: 2025-08-18 | End: 2026-03-06

## 2025-08-18 RX ORDER — FLUCONAZOLE 200 MG/1
200 TABLET ORAL DAILY
Qty: 90 TABLET | Refills: 1 | Status: SHIPPED | OUTPATIENT
Start: 2025-08-18 | End: 2026-02-14

## 2025-08-18 RX ORDER — ACYCLOVIR 400 MG/1
400 TABLET ORAL 2 TIMES DAILY
Qty: 180 TABLET | Refills: 1 | Status: SHIPPED | OUTPATIENT
Start: 2025-08-18 | End: 2026-02-14

## 2025-08-21 ENCOUNTER — TELEMEDICINE (OUTPATIENT)
Dept: HEMATOLOGY/ONCOLOGY | Facility: CLINIC | Age: 80
End: 2025-08-21
Payer: COMMERCIAL

## 2025-08-21 DIAGNOSIS — D61.01 PURE RED CELL APLASIA (MULTI): Primary | ICD-10-CM

## 2025-08-21 PROCEDURE — 1159F MED LIST DOCD IN RCRD: CPT | Performed by: NURSE PRACTITIONER

## 2025-08-21 PROCEDURE — G2211 COMPLEX E/M VISIT ADD ON: HCPCS | Performed by: NURSE PRACTITIONER

## 2025-08-21 PROCEDURE — 1160F RVW MEDS BY RX/DR IN RCRD: CPT | Performed by: NURSE PRACTITIONER

## 2025-08-21 PROCEDURE — 99212 OFFICE O/P EST SF 10 MIN: CPT | Performed by: NURSE PRACTITIONER
